# Patient Record
Sex: MALE | Race: WHITE | NOT HISPANIC OR LATINO | Employment: OTHER | URBAN - METROPOLITAN AREA
[De-identification: names, ages, dates, MRNs, and addresses within clinical notes are randomized per-mention and may not be internally consistent; named-entity substitution may affect disease eponyms.]

---

## 2022-10-17 ENCOUNTER — APPOINTMENT (OUTPATIENT)
Dept: RADIOLOGY | Facility: MEDICAL CENTER | Age: 72
DRG: 028 | End: 2022-10-17
Payer: OTHER MISCELLANEOUS

## 2022-10-17 ENCOUNTER — HOSPITAL ENCOUNTER (INPATIENT)
Facility: MEDICAL CENTER | Age: 72
LOS: 15 days | DRG: 028 | End: 2022-11-01
Attending: EMERGENCY MEDICINE | Admitting: SURGERY
Payer: OTHER MISCELLANEOUS

## 2022-10-17 ENCOUNTER — APPOINTMENT (OUTPATIENT)
Dept: RADIOLOGY | Facility: MEDICAL CENTER | Age: 72
DRG: 028 | End: 2022-10-17
Attending: NEUROLOGICAL SURGERY
Payer: OTHER MISCELLANEOUS

## 2022-10-17 ENCOUNTER — APPOINTMENT (OUTPATIENT)
Dept: RADIOLOGY | Facility: MEDICAL CENTER | Age: 72
DRG: 028 | End: 2022-10-17
Attending: EMERGENCY MEDICINE
Payer: OTHER MISCELLANEOUS

## 2022-10-17 DIAGNOSIS — I10 PRIMARY HYPERTENSION: ICD-10-CM

## 2022-10-17 DIAGNOSIS — T14.90XA TRAUMA: ICD-10-CM

## 2022-10-17 DIAGNOSIS — T14.8XXA: ICD-10-CM

## 2022-10-17 DIAGNOSIS — S12.600A CLOSED DISPLACED FRACTURE OF SEVENTH CERVICAL VERTEBRA, UNSPECIFIED FRACTURE MORPHOLOGY, INITIAL ENCOUNTER (HCC): ICD-10-CM

## 2022-10-17 PROBLEM — G47.09 OTHER INSOMNIA: Status: ACTIVE | Noted: 2022-10-17

## 2022-10-17 PROBLEM — S22.009A: Status: ACTIVE | Noted: 2022-10-17

## 2022-10-17 PROBLEM — G89.29 PAIN, CHRONIC: Status: ACTIVE | Noted: 2022-10-17

## 2022-10-17 PROBLEM — S61.219A FINGER LACERATION, INITIAL ENCOUNTER: Status: ACTIVE | Noted: 2022-10-17

## 2022-10-17 LAB
ABO GROUP BLD: NORMAL
ALBUMIN SERPL BCP-MCNC: 3.2 G/DL (ref 3.2–4.9)
ALBUMIN/GLOB SERPL: 1.1 G/DL
ALP SERPL-CCNC: 101 U/L (ref 30–99)
ALT SERPL-CCNC: 25 U/L (ref 2–50)
ANION GAP SERPL CALC-SCNC: 8 MMOL/L (ref 7–16)
APTT PPP: 32.5 SEC (ref 24.7–36)
AST SERPL-CCNC: 46 U/L (ref 12–45)
BILIRUB SERPL-MCNC: 0.8 MG/DL (ref 0.1–1.5)
BLD GP AB SCN SERPL QL: NORMAL
BUN SERPL-MCNC: 37 MG/DL (ref 8–22)
CALCIUM SERPL-MCNC: 7.7 MG/DL (ref 8.5–10.5)
CFT BLD TEG: 4.1 MIN (ref 4.6–9.1)
CFT P HPASE BLD TEG: 4.2 MIN (ref 4.3–8.3)
CHLORIDE SERPL-SCNC: 107 MMOL/L (ref 96–112)
CLOT ANGLE BLD TEG: 77.5 DEGREES (ref 63–78)
CLOT LYSIS 30M P MA LENFR BLD TEG: 0.1 % (ref 0–2.6)
CO2 SERPL-SCNC: 21 MMOL/L (ref 20–33)
CREAT SERPL-MCNC: 1.08 MG/DL (ref 0.5–1.4)
CT.EXTRINSIC BLD ROTEM: 0.9 MIN (ref 0.8–2.1)
ERYTHROCYTE [DISTWIDTH] IN BLOOD BY AUTOMATED COUNT: 53.9 FL (ref 35.9–50)
ETHANOL BLD-MCNC: <10.1 MG/DL
GFR SERPLBLD CREATININE-BSD FMLA CKD-EPI: 73 ML/MIN/1.73 M 2
GLOBULIN SER CALC-MCNC: 2.9 G/DL (ref 1.9–3.5)
GLUCOSE SERPL-MCNC: 128 MG/DL (ref 65–99)
HCT VFR BLD AUTO: 20.2 % (ref 42–52)
HGB BLD-MCNC: 6.5 G/DL (ref 14–18)
INR PPP: 1.5 (ref 0.87–1.13)
MCF BLD TEG: 64.9 MM (ref 52–69)
MCF.PLATELET INHIB BLD ROTEM: 21.5 MM (ref 15–32)
MCH RBC QN AUTO: 30.2 PG (ref 27–33)
MCHC RBC AUTO-ENTMCNC: 32.2 G/DL (ref 33.7–35.3)
MCV RBC AUTO: 94 FL (ref 81.4–97.8)
PA AA BLD-ACNC: 50.8 % (ref 0–11)
PA ADP BLD-ACNC: 90 % (ref 0–17)
PLATELET # BLD AUTO: 120 K/UL (ref 164–446)
PMV BLD AUTO: 12.8 FL (ref 9–12.9)
POTASSIUM SERPL-SCNC: 5.3 MMOL/L (ref 3.6–5.5)
PROT SERPL-MCNC: 6.1 G/DL (ref 6–8.2)
PROTHROMBIN TIME: 17.8 SEC (ref 12–14.6)
RBC # BLD AUTO: 2.15 M/UL (ref 4.7–6.1)
RH BLD: NORMAL
SODIUM SERPL-SCNC: 136 MMOL/L (ref 135–145)
TEG ALGORITHM TGALG: ABNORMAL
WBC # BLD AUTO: 17 K/UL (ref 4.8–10.8)

## 2022-10-17 PROCEDURE — 85610 PROTHROMBIN TIME: CPT

## 2022-10-17 PROCEDURE — 72170 X-RAY EXAM OF PELVIS: CPT

## 2022-10-17 PROCEDURE — 86901 BLOOD TYPING SEROLOGIC RH(D): CPT

## 2022-10-17 PROCEDURE — 80053 COMPREHEN METABOLIC PANEL: CPT

## 2022-10-17 PROCEDURE — 73070 X-RAY EXAM OF ELBOW: CPT | Mod: LT

## 2022-10-17 PROCEDURE — 770022 HCHG ROOM/CARE - ICU (200)

## 2022-10-17 PROCEDURE — 36415 COLL VENOUS BLD VENIPUNCTURE: CPT

## 2022-10-17 PROCEDURE — 12002 RPR S/N/AX/GEN/TRNK2.6-7.5CM: CPT | Performed by: SPECIALIST

## 2022-10-17 PROCEDURE — 85027 COMPLETE CBC AUTOMATED: CPT

## 2022-10-17 PROCEDURE — 90715 TDAP VACCINE 7 YRS/> IM: CPT | Performed by: EMERGENCY MEDICINE

## 2022-10-17 PROCEDURE — 72131 CT LUMBAR SPINE W/O DYE: CPT

## 2022-10-17 PROCEDURE — 30233N1 TRANSFUSION OF NONAUTOLOGOUS RED BLOOD CELLS INTO PERIPHERAL VEIN, PERCUTANEOUS APPROACH: ICD-10-PCS | Performed by: SURGERY

## 2022-10-17 PROCEDURE — 70498 CT ANGIOGRAPHY NECK: CPT

## 2022-10-17 PROCEDURE — 90471 IMMUNIZATION ADMIN: CPT

## 2022-10-17 PROCEDURE — G0390 TRAUMA RESPONS W/HOSP CRITI: HCPCS

## 2022-10-17 PROCEDURE — 700111 HCHG RX REV CODE 636 W/ 250 OVERRIDE (IP): Performed by: EMERGENCY MEDICINE

## 2022-10-17 PROCEDURE — 96374 THER/PROPH/DIAG INJ IV PUSH: CPT

## 2022-10-17 PROCEDURE — 85730 THROMBOPLASTIN TIME PARTIAL: CPT

## 2022-10-17 PROCEDURE — 99291 CRITICAL CARE FIRST HOUR: CPT

## 2022-10-17 PROCEDURE — 700117 HCHG RX CONTRAST REV CODE 255: Performed by: EMERGENCY MEDICINE

## 2022-10-17 PROCEDURE — 86850 RBC ANTIBODY SCREEN: CPT

## 2022-10-17 PROCEDURE — P9016 RBC LEUKOCYTES REDUCED: HCPCS

## 2022-10-17 PROCEDURE — 700105 HCHG RX REV CODE 258: Performed by: SURGERY

## 2022-10-17 PROCEDURE — 85576 BLOOD PLATELET AGGREGATION: CPT | Mod: 91

## 2022-10-17 PROCEDURE — 71045 X-RAY EXAM CHEST 1 VIEW: CPT

## 2022-10-17 PROCEDURE — 700111 HCHG RX REV CODE 636 W/ 250 OVERRIDE (IP): Performed by: SURGERY

## 2022-10-17 PROCEDURE — 70450 CT HEAD/BRAIN W/O DYE: CPT

## 2022-10-17 PROCEDURE — 71260 CT THORAX DX C+: CPT

## 2022-10-17 PROCEDURE — 72141 MRI NECK SPINE W/O DYE: CPT

## 2022-10-17 PROCEDURE — 85347 COAGULATION TIME ACTIVATED: CPT

## 2022-10-17 PROCEDURE — 82077 ASSAY SPEC XCP UR&BREATH IA: CPT

## 2022-10-17 PROCEDURE — 700117 HCHG RX CONTRAST REV CODE 255: Performed by: NEUROLOGICAL SURGERY

## 2022-10-17 PROCEDURE — 85384 FIBRINOGEN ACTIVITY: CPT

## 2022-10-17 PROCEDURE — 99291 CRITICAL CARE FIRST HOUR: CPT | Performed by: SURGERY

## 2022-10-17 PROCEDURE — 0KQD0ZZ REPAIR LEFT HAND MUSCLE, OPEN APPROACH: ICD-10-PCS | Performed by: SURGERY

## 2022-10-17 PROCEDURE — 96375 TX/PRO/DX INJ NEW DRUG ADDON: CPT

## 2022-10-17 PROCEDURE — 700101 HCHG RX REV CODE 250

## 2022-10-17 PROCEDURE — A9270 NON-COVERED ITEM OR SERVICE: HCPCS | Performed by: SURGERY

## 2022-10-17 PROCEDURE — 72128 CT CHEST SPINE W/O DYE: CPT

## 2022-10-17 PROCEDURE — 86900 BLOOD TYPING SEROLOGIC ABO: CPT

## 2022-10-17 PROCEDURE — 72125 CT NECK SPINE W/O DYE: CPT

## 2022-10-17 PROCEDURE — 36430 TRANSFUSION BLD/BLD COMPNT: CPT

## 2022-10-17 PROCEDURE — 700102 HCHG RX REV CODE 250 W/ 637 OVERRIDE(OP): Performed by: SURGERY

## 2022-10-17 PROCEDURE — 3E0234Z INTRODUCTION OF SERUM, TOXOID AND VACCINE INTO MUSCLE, PERCUTANEOUS APPROACH: ICD-10-PCS | Performed by: EMERGENCY MEDICINE

## 2022-10-17 PROCEDURE — 86923 COMPATIBILITY TEST ELECTRIC: CPT

## 2022-10-17 RX ORDER — ACETAMINOPHEN 325 MG/1
650 TABLET ORAL EVERY 6 HOURS PRN
Status: DISCONTINUED | OUTPATIENT
Start: 2022-10-22 | End: 2022-10-18

## 2022-10-17 RX ORDER — BISACODYL 10 MG
10 SUPPOSITORY, RECTAL RECTAL
Status: DISCONTINUED | OUTPATIENT
Start: 2022-10-17 | End: 2022-11-01 | Stop reason: HOSPADM

## 2022-10-17 RX ORDER — OXYCODONE HYDROCHLORIDE 10 MG/1
10 TABLET ORAL ONCE
Status: COMPLETED | OUTPATIENT
Start: 2022-10-17 | End: 2022-10-17

## 2022-10-17 RX ORDER — METAXALONE 800 MG/1
400 TABLET ORAL 3 TIMES DAILY
Status: DISCONTINUED | OUTPATIENT
Start: 2022-10-17 | End: 2022-10-18

## 2022-10-17 RX ORDER — BACITRACIN ZINC 500 [USP'U]/G
OINTMENT TOPICAL DAILY
Status: DISCONTINUED | OUTPATIENT
Start: 2022-10-18 | End: 2022-10-23

## 2022-10-17 RX ORDER — ENEMA 19; 7 G/133ML; G/133ML
1 ENEMA RECTAL
Status: DISCONTINUED | OUTPATIENT
Start: 2022-10-17 | End: 2022-11-01 | Stop reason: HOSPADM

## 2022-10-17 RX ORDER — AMOXICILLIN 250 MG
1 CAPSULE ORAL NIGHTLY
Status: DISCONTINUED | OUTPATIENT
Start: 2022-10-17 | End: 2022-10-18

## 2022-10-17 RX ORDER — TRAZODONE HYDROCHLORIDE 50 MG/1
25-50 TABLET ORAL
COMMUNITY

## 2022-10-17 RX ORDER — OXYCODONE HYDROCHLORIDE 5 MG/1
5 TABLET ORAL EVERY 4 HOURS PRN
COMMUNITY

## 2022-10-17 RX ORDER — HYDROMORPHONE HYDROCHLORIDE 1 MG/ML
0.5 INJECTION, SOLUTION INTRAMUSCULAR; INTRAVENOUS; SUBCUTANEOUS
Status: DISCONTINUED | OUTPATIENT
Start: 2022-10-17 | End: 2022-10-18

## 2022-10-17 RX ORDER — AMOXICILLIN 250 MG
1 CAPSULE ORAL
Status: DISCONTINUED | OUTPATIENT
Start: 2022-10-17 | End: 2022-10-18

## 2022-10-17 RX ORDER — FERROUS GLUCONATE 324(38)MG
324 TABLET ORAL
Status: SHIPPED | COMMUNITY
End: 2022-10-17

## 2022-10-17 RX ORDER — SODIUM CHLORIDE 9 MG/ML
INJECTION, SOLUTION INTRAVENOUS CONTINUOUS
Status: DISCONTINUED | OUTPATIENT
Start: 2022-10-17 | End: 2022-10-20

## 2022-10-17 RX ORDER — CEFAZOLIN 2 G/1
2 INJECTION, POWDER, FOR SOLUTION INTRAMUSCULAR; INTRAVENOUS ONCE
Status: COMPLETED | OUTPATIENT
Start: 2022-10-17 | End: 2022-10-17

## 2022-10-17 RX ORDER — OXYCODONE HCL 10 MG/1
10 TABLET, FILM COATED, EXTENDED RELEASE ORAL EVERY 12 HOURS
COMMUNITY

## 2022-10-17 RX ORDER — ACETAMINOPHEN 325 MG/1
650 TABLET ORAL EVERY 6 HOURS
Status: DISCONTINUED | OUTPATIENT
Start: 2022-10-17 | End: 2022-10-18

## 2022-10-17 RX ORDER — LORAZEPAM 1 MG/1
1 TABLET ORAL 2 TIMES DAILY PRN
COMMUNITY

## 2022-10-17 RX ORDER — PANTOPRAZOLE SODIUM 40 MG/10ML
40 INJECTION, POWDER, LYOPHILIZED, FOR SOLUTION INTRAVENOUS DAILY
Status: DISCONTINUED | OUTPATIENT
Start: 2022-10-18 | End: 2022-10-20

## 2022-10-17 RX ORDER — OXYCODONE HYDROCHLORIDE 5 MG/1
5 TABLET ORAL
Status: DISCONTINUED | OUTPATIENT
Start: 2022-10-17 | End: 2022-10-18

## 2022-10-17 RX ORDER — DOCUSATE SODIUM 100 MG/1
100 CAPSULE, LIQUID FILLED ORAL 2 TIMES DAILY
Status: DISCONTINUED | OUTPATIENT
Start: 2022-10-17 | End: 2022-10-18

## 2022-10-17 RX ORDER — POLYETHYLENE GLYCOL 3350 17 G/17G
1 POWDER, FOR SOLUTION ORAL 2 TIMES DAILY
Status: DISCONTINUED | OUTPATIENT
Start: 2022-10-17 | End: 2022-10-18

## 2022-10-17 RX ORDER — ONDANSETRON 2 MG/ML
4 INJECTION INTRAMUSCULAR; INTRAVENOUS EVERY 4 HOURS PRN
Status: DISCONTINUED | OUTPATIENT
Start: 2022-10-17 | End: 2022-11-01 | Stop reason: HOSPADM

## 2022-10-17 RX ORDER — MAGNESIUM OXIDE 400 MG/1
400 TABLET ORAL DAILY
Status: SHIPPED | COMMUNITY
End: 2022-10-17

## 2022-10-17 RX ORDER — LISINOPRIL 5 MG/1
5 TABLET ORAL DAILY
Status: SHIPPED | COMMUNITY
End: 2022-10-17

## 2022-10-17 RX ORDER — ONDANSETRON 8 MG/1
8 TABLET, ORALLY DISINTEGRATING ORAL EVERY 8 HOURS PRN
Status: SHIPPED | COMMUNITY
End: 2022-10-17

## 2022-10-17 RX ORDER — OXYCODONE HYDROCHLORIDE 10 MG/1
10 TABLET ORAL
Status: DISCONTINUED | OUTPATIENT
Start: 2022-10-17 | End: 2022-10-18

## 2022-10-17 RX ORDER — LIDOCAINE HYDROCHLORIDE 10 MG/ML
INJECTION, SOLUTION INFILTRATION; PERINEURAL
Status: COMPLETED
Start: 2022-10-17 | End: 2022-10-17

## 2022-10-17 RX ORDER — GABAPENTIN 100 MG/1
100 CAPSULE ORAL EVERY 8 HOURS
Status: DISCONTINUED | OUTPATIENT
Start: 2022-10-17 | End: 2022-10-18

## 2022-10-17 RX ADMIN — LIDOCAINE HYDROCHLORIDE: 10 INJECTION, SOLUTION INFILTRATION; PERINEURAL at 21:52

## 2022-10-17 RX ADMIN — HYDROMORPHONE HYDROCHLORIDE 0.5 MG: 1 INJECTION, SOLUTION INTRAMUSCULAR; INTRAVENOUS; SUBCUTANEOUS at 19:23

## 2022-10-17 RX ADMIN — METAXALONE 400 MG: 800 TABLET ORAL at 20:36

## 2022-10-17 RX ADMIN — SODIUM CHLORIDE: 9 INJECTION, SOLUTION INTRAVENOUS at 20:37

## 2022-10-17 RX ADMIN — OXYCODONE HYDROCHLORIDE 10 MG: 10 TABLET ORAL at 21:34

## 2022-10-17 RX ADMIN — CLOSTRIDIUM TETANI TOXOID ANTIGEN (FORMALDEHYDE INACTIVATED), CORYNEBACTERIUM DIPHTHERIAE TOXOID ANTIGEN (FORMALDEHYDE INACTIVATED), BORDETELLA PERTUSSIS TOXOID ANTIGEN (GLUTARALDEHYDE INACTIVATED), BORDETELLA PERTUSSIS FILAMENTOUS HEMAGGLUTININ ANTIGEN (FORMALDEHYDE INACTIVATED), BORDETELLA PERTUSSIS PERTACTIN ANTIGEN, AND BORDETELLA PERTUSSIS FIMBRIAE 2/3 ANTIGEN 0.5 ML: 5; 2; 2.5; 5; 3; 5 INJECTION, SUSPENSION INTRAMUSCULAR at 16:04

## 2022-10-17 RX ADMIN — FENTANYL CITRATE 50 MCG: 50 INJECTION, SOLUTION INTRAMUSCULAR; INTRAVENOUS at 16:11

## 2022-10-17 RX ADMIN — IOHEXOL 80 ML: 350 INJECTION, SOLUTION INTRAVENOUS at 19:45

## 2022-10-17 RX ADMIN — IOHEXOL 100 ML: 350 INJECTION, SOLUTION INTRAVENOUS at 16:05

## 2022-10-17 RX ADMIN — CEFAZOLIN 2 G: 2 INJECTION, POWDER, FOR SOLUTION INTRAMUSCULAR; INTRAVENOUS at 16:04

## 2022-10-17 RX ADMIN — GABAPENTIN 100 MG: 100 CAPSULE ORAL at 21:35

## 2022-10-17 ASSESSMENT — FIBROSIS 4 INDEX: FIB4 SCORE: 5.44

## 2022-10-17 ASSESSMENT — LIFESTYLE VARIABLES
ON A TYPICAL DAY WHEN YOU DRINK ALCOHOL HOW MANY DRINKS DO YOU HAVE: 0
HOW MANY TIMES IN THE PAST YEAR HAVE YOU HAD 5 OR MORE DRINKS IN A DAY: 0
TOTAL SCORE: 0
EVER FELT BAD OR GUILTY ABOUT YOUR DRINKING: NO
DO YOU DRINK ALCOHOL: NO
TOTAL SCORE: 0
TOTAL SCORE: 0
EVER HAD A DRINK FIRST THING IN THE MORNING TO STEADY YOUR NERVES TO GET RID OF A HANGOVER: NO
CONSUMPTION TOTAL: NEGATIVE
AVERAGE NUMBER OF DAYS PER WEEK YOU HAVE A DRINK CONTAINING ALCOHOL: 0
HAVE YOU EVER FELT YOU SHOULD CUT DOWN ON YOUR DRINKING: NO
HAVE PEOPLE ANNOYED YOU BY CRITICIZING YOUR DRINKING: NO

## 2022-10-17 ASSESSMENT — COPD QUESTIONNAIRES
DO YOU EVER COUGH UP ANY MUCUS OR PHLEGM?: NO/ONLY WITH OCCASIONAL COLDS OR INFECTIONS
COPD SCREENING SCORE: 4
HAVE YOU SMOKED AT LEAST 100 CIGARETTES IN YOUR ENTIRE LIFE: NO/DON'T KNOW
DURING THE PAST 4 WEEKS HOW MUCH DID YOU FEEL SHORT OF BREATH: SOME OF THE TIME

## 2022-10-17 ASSESSMENT — PAIN DESCRIPTION - PAIN TYPE
TYPE: ACUTE PAIN;CHRONIC PAIN
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
## 2022-10-18 ENCOUNTER — APPOINTMENT (OUTPATIENT)
Dept: RADIOLOGY | Facility: MEDICAL CENTER | Age: 72
DRG: 028 | End: 2022-10-18
Attending: NURSE PRACTITIONER
Payer: OTHER MISCELLANEOUS

## 2022-10-18 ENCOUNTER — APPOINTMENT (OUTPATIENT)
Dept: RADIOLOGY | Facility: MEDICAL CENTER | Age: 72
DRG: 028 | End: 2022-10-18
Attending: SPECIALIST
Payer: OTHER MISCELLANEOUS

## 2022-10-18 PROBLEM — J90 BILATERAL PLEURAL EFFUSION: Status: ACTIVE | Noted: 2022-10-18

## 2022-10-18 PROBLEM — M25.521 RIGHT ELBOW PAIN: Status: ACTIVE | Noted: 2022-10-18

## 2022-10-18 PROBLEM — M25.572 ACUTE LEFT ANKLE PAIN: Status: ACTIVE | Noted: 2022-10-18

## 2022-10-18 PROBLEM — I65.22 INTERNAL CAROTID ARTERY STENOSIS, LEFT: Status: ACTIVE | Noted: 2022-10-18

## 2022-10-18 LAB
ALBUMIN SERPL BCP-MCNC: 2.9 G/DL (ref 3.2–4.9)
ALBUMIN/GLOB SERPL: 1.1 G/DL
ALP SERPL-CCNC: 88 U/L (ref 30–99)
ALT SERPL-CCNC: 26 U/L (ref 2–50)
ANION GAP SERPL CALC-SCNC: 8 MMOL/L (ref 7–16)
AST SERPL-CCNC: 64 U/L (ref 12–45)
BASOPHILS # BLD AUTO: 0 % (ref 0–1.8)
BASOPHILS # BLD AUTO: 0.1 % (ref 0–1.8)
BASOPHILS # BLD: 0 K/UL (ref 0–0.12)
BASOPHILS # BLD: 0.01 K/UL (ref 0–0.12)
BILIRUB SERPL-MCNC: 1.5 MG/DL (ref 0.1–1.5)
BUN SERPL-MCNC: 35 MG/DL (ref 8–22)
CALCIUM SERPL-MCNC: 7.6 MG/DL (ref 8.5–10.5)
CFT BLD TEG: 6.1 MIN (ref 4.6–9.1)
CFT P HPASE BLD TEG: 7 MIN (ref 4.3–8.3)
CHLORIDE SERPL-SCNC: 107 MMOL/L (ref 96–112)
CLOT ANGLE BLD TEG: 70.1 DEGREES (ref 63–78)
CLOT LYSIS 30M P MA LENFR BLD TEG: 1 % (ref 0–2.6)
CO2 SERPL-SCNC: 20 MMOL/L (ref 20–33)
CREAT SERPL-MCNC: 1.08 MG/DL (ref 0.5–1.4)
CT.EXTRINSIC BLD ROTEM: 1.5 MIN (ref 0.8–2.1)
EOSINOPHIL # BLD AUTO: 0 K/UL (ref 0–0.51)
EOSINOPHIL # BLD AUTO: 0.02 K/UL (ref 0–0.51)
EOSINOPHIL NFR BLD: 0 % (ref 0–6.9)
EOSINOPHIL NFR BLD: 0.3 % (ref 0–6.9)
ERYTHROCYTE [DISTWIDTH] IN BLOOD BY AUTOMATED COUNT: 50.8 FL (ref 35.9–50)
ERYTHROCYTE [DISTWIDTH] IN BLOOD BY AUTOMATED COUNT: 54.4 FL (ref 35.9–50)
GFR SERPLBLD CREATININE-BSD FMLA CKD-EPI: 73 ML/MIN/1.73 M 2
GLOBULIN SER CALC-MCNC: 2.7 G/DL (ref 1.9–3.5)
GLUCOSE BLD STRIP.AUTO-MCNC: 103 MG/DL (ref 65–99)
GLUCOSE BLD STRIP.AUTO-MCNC: 114 MG/DL (ref 65–99)
GLUCOSE SERPL-MCNC: 139 MG/DL (ref 65–99)
HCT VFR BLD AUTO: 20.4 % (ref 42–52)
HCT VFR BLD AUTO: 22.1 % (ref 42–52)
HGB BLD-MCNC: 6.6 G/DL (ref 14–18)
HGB BLD-MCNC: 7.1 G/DL (ref 14–18)
HGB RETIC QN AUTO: 27.7 PG/CELL (ref 29–35)
IMM GRANULOCYTES # BLD AUTO: 0.06 K/UL (ref 0–0.11)
IMM GRANULOCYTES # BLD AUTO: 0.07 K/UL (ref 0–0.11)
IMM GRANULOCYTES NFR BLD AUTO: 0.8 % (ref 0–0.9)
IMM GRANULOCYTES NFR BLD AUTO: 0.8 % (ref 0–0.9)
IMM RETICS NFR: 36.6 % (ref 9.3–17.4)
IRON SATN MFR SERPL: 10 % (ref 15–55)
IRON SERPL-MCNC: 21 UG/DL (ref 50–180)
LYMPHOCYTES # BLD AUTO: 0.35 K/UL (ref 1–4.8)
LYMPHOCYTES # BLD AUTO: 0.37 K/UL (ref 1–4.8)
LYMPHOCYTES NFR BLD: 3.8 % (ref 22–41)
LYMPHOCYTES NFR BLD: 5 % (ref 22–41)
MCF BLD TEG: 58.9 MM (ref 52–69)
MCF.PLATELET INHIB BLD ROTEM: 19.4 MM (ref 15–32)
MCH RBC QN AUTO: 29.2 PG (ref 27–33)
MCH RBC QN AUTO: 29.3 PG (ref 27–33)
MCHC RBC AUTO-ENTMCNC: 32.1 G/DL (ref 33.7–35.3)
MCHC RBC AUTO-ENTMCNC: 32.4 G/DL (ref 33.7–35.3)
MCV RBC AUTO: 90.7 FL (ref 81.4–97.8)
MCV RBC AUTO: 90.9 FL (ref 81.4–97.8)
MONOCYTES # BLD AUTO: 0.47 K/UL (ref 0–0.85)
MONOCYTES # BLD AUTO: 0.49 K/UL (ref 0–0.85)
MONOCYTES NFR BLD AUTO: 5.3 % (ref 0–13.4)
MONOCYTES NFR BLD AUTO: 6.4 % (ref 0–13.4)
NEUTROPHILS # BLD AUTO: 6.43 K/UL (ref 1.82–7.42)
NEUTROPHILS # BLD AUTO: 8.31 K/UL (ref 1.82–7.42)
NEUTROPHILS NFR BLD: 87.4 % (ref 44–72)
NEUTROPHILS NFR BLD: 90.1 % (ref 44–72)
NRBC # BLD AUTO: 0 K/UL
NRBC # BLD AUTO: 0 K/UL
NRBC BLD-RTO: 0 /100 WBC
NRBC BLD-RTO: 0 /100 WBC
PA AA BLD-ACNC: 28 % (ref 0–11)
PA ADP BLD-ACNC: 43.3 % (ref 0–17)
PLATELET # BLD AUTO: 63 K/UL (ref 164–446)
PLATELET # BLD AUTO: 75 K/UL (ref 164–446)
PMV BLD AUTO: 11.9 FL (ref 9–12.9)
PMV BLD AUTO: 12.5 FL (ref 9–12.9)
POTASSIUM SERPL-SCNC: 5.7 MMOL/L (ref 3.6–5.5)
PROT SERPL-MCNC: 5.6 G/DL (ref 6–8.2)
RBC # BLD AUTO: 2.25 M/UL (ref 4.7–6.1)
RBC # BLD AUTO: 2.43 M/UL (ref 4.7–6.1)
RETICS # AUTO: 0.09 M/UL (ref 0.04–0.06)
RETICS/RBC NFR: 4.2 % (ref 0.8–2.1)
SODIUM SERPL-SCNC: 135 MMOL/L (ref 135–145)
TEG ALGORITHM TGALG: ABNORMAL
TIBC SERPL-MCNC: 210 UG/DL (ref 250–450)
UIBC SERPL-MCNC: 189 UG/DL (ref 110–370)
WBC # BLD AUTO: 7.4 K/UL (ref 4.8–10.8)
WBC # BLD AUTO: 9.2 K/UL (ref 4.8–10.8)

## 2022-10-18 PROCEDURE — 73110 X-RAY EXAM OF WRIST: CPT | Mod: LT

## 2022-10-18 PROCEDURE — 85347 COAGULATION TIME ACTIVATED: CPT

## 2022-10-18 PROCEDURE — 36430 TRANSFUSION BLD/BLD COMPNT: CPT

## 2022-10-18 PROCEDURE — 86923 COMPATIBILITY TEST ELECTRIC: CPT

## 2022-10-18 PROCEDURE — 85046 RETICYTE/HGB CONCENTRATE: CPT

## 2022-10-18 PROCEDURE — 85384 FIBRINOGEN ACTIVITY: CPT

## 2022-10-18 PROCEDURE — A9270 NON-COVERED ITEM OR SERVICE: HCPCS | Performed by: SURGERY

## 2022-10-18 PROCEDURE — 82962 GLUCOSE BLOOD TEST: CPT | Mod: 91

## 2022-10-18 PROCEDURE — 80053 COMPREHEN METABOLIC PANEL: CPT

## 2022-10-18 PROCEDURE — 73130 X-RAY EXAM OF HAND: CPT | Mod: LT

## 2022-10-18 PROCEDURE — P9016 RBC LEUKOCYTES REDUCED: HCPCS

## 2022-10-18 PROCEDURE — 770022 HCHG ROOM/CARE - ICU (200)

## 2022-10-18 PROCEDURE — 700111 HCHG RX REV CODE 636 W/ 250 OVERRIDE (IP): Performed by: SURGERY

## 2022-10-18 PROCEDURE — 700105 HCHG RX REV CODE 258: Performed by: SURGERY

## 2022-10-18 PROCEDURE — 85576 BLOOD PLATELET AGGREGATION: CPT | Mod: 91

## 2022-10-18 PROCEDURE — 83550 IRON BINDING TEST: CPT

## 2022-10-18 PROCEDURE — 73080 X-RAY EXAM OF ELBOW: CPT | Mod: LT

## 2022-10-18 PROCEDURE — C9113 INJ PANTOPRAZOLE SODIUM, VIA: HCPCS | Performed by: SURGERY

## 2022-10-18 PROCEDURE — 99233 SBSQ HOSP IP/OBS HIGH 50: CPT | Performed by: SURGERY

## 2022-10-18 PROCEDURE — 700102 HCHG RX REV CODE 250 W/ 637 OVERRIDE(OP): Performed by: SURGERY

## 2022-10-18 PROCEDURE — 83540 ASSAY OF IRON: CPT

## 2022-10-18 PROCEDURE — 73610 X-RAY EXAM OF ANKLE: CPT | Mod: LT

## 2022-10-18 PROCEDURE — 85025 COMPLETE CBC W/AUTO DIFF WBC: CPT | Mod: 91

## 2022-10-18 RX ORDER — ACETAMINOPHEN 325 MG/1
650 TABLET ORAL EVERY 6 HOURS
Status: DISCONTINUED | OUTPATIENT
Start: 2022-10-18 | End: 2022-10-19

## 2022-10-18 RX ORDER — ACETAMINOPHEN 325 MG/1
650 TABLET ORAL EVERY 6 HOURS PRN
Status: DISCONTINUED | OUTPATIENT
Start: 2022-10-22 | End: 2022-10-19

## 2022-10-18 RX ORDER — HYDROMORPHONE HYDROCHLORIDE 1 MG/ML
0.5 INJECTION, SOLUTION INTRAMUSCULAR; INTRAVENOUS; SUBCUTANEOUS
Status: DISCONTINUED | OUTPATIENT
Start: 2022-10-18 | End: 2022-10-19

## 2022-10-18 RX ORDER — AMOXICILLIN 250 MG
1 CAPSULE ORAL
Status: DISCONTINUED | OUTPATIENT
Start: 2022-10-18 | End: 2022-11-01 | Stop reason: HOSPADM

## 2022-10-18 RX ORDER — DOCUSATE SODIUM 50 MG/5ML
100 LIQUID ORAL 2 TIMES DAILY
Status: DISCONTINUED | OUTPATIENT
Start: 2022-10-18 | End: 2022-11-01 | Stop reason: HOSPADM

## 2022-10-18 RX ORDER — POLYETHYLENE GLYCOL 3350 17 G/17G
1 POWDER, FOR SOLUTION ORAL 2 TIMES DAILY
Status: DISCONTINUED | OUTPATIENT
Start: 2022-10-18 | End: 2022-11-01 | Stop reason: HOSPADM

## 2022-10-18 RX ORDER — GABAPENTIN 100 MG/1
100 CAPSULE ORAL EVERY 8 HOURS
Status: DISCONTINUED | OUTPATIENT
Start: 2022-10-18 | End: 2022-10-26

## 2022-10-18 RX ORDER — OXYCODONE HYDROCHLORIDE 10 MG/1
10 TABLET ORAL
Status: DISCONTINUED | OUTPATIENT
Start: 2022-10-18 | End: 2022-10-19

## 2022-10-18 RX ORDER — OXYCODONE HYDROCHLORIDE 5 MG/1
5 TABLET ORAL
Status: DISCONTINUED | OUTPATIENT
Start: 2022-10-18 | End: 2022-10-19

## 2022-10-18 RX ORDER — METAXALONE 800 MG/1
400 TABLET ORAL 3 TIMES DAILY
Status: DISCONTINUED | OUTPATIENT
Start: 2022-10-18 | End: 2022-11-01 | Stop reason: HOSPADM

## 2022-10-18 RX ORDER — AMOXICILLIN 250 MG
1 CAPSULE ORAL NIGHTLY
Status: DISCONTINUED | OUTPATIENT
Start: 2022-10-18 | End: 2022-11-01 | Stop reason: HOSPADM

## 2022-10-18 RX ADMIN — SODIUM CHLORIDE 250 MG: 9 INJECTION, SOLUTION INTRAVENOUS at 17:48

## 2022-10-18 RX ADMIN — PANTOPRAZOLE SODIUM 40 MG: 40 INJECTION, POWDER, LYOPHILIZED, FOR SOLUTION INTRAVENOUS at 05:24

## 2022-10-18 RX ADMIN — HYDROMORPHONE HYDROCHLORIDE 0.5 MG: 1 INJECTION, SOLUTION INTRAMUSCULAR; INTRAVENOUS; SUBCUTANEOUS at 17:22

## 2022-10-18 RX ADMIN — SODIUM CHLORIDE: 9 INJECTION, SOLUTION INTRAVENOUS at 20:38

## 2022-10-18 RX ADMIN — BACITRACIN ZINC: 500 OINTMENT TOPICAL at 09:51

## 2022-10-18 RX ADMIN — OXYCODONE HYDROCHLORIDE 10 MG: 10 TABLET ORAL at 00:43

## 2022-10-18 RX ADMIN — OXYCODONE HYDROCHLORIDE 10 MG: 10 TABLET ORAL at 04:30

## 2022-10-18 RX ADMIN — SODIUM CHLORIDE: 9 INJECTION, SOLUTION INTRAVENOUS at 09:15

## 2022-10-18 RX ADMIN — DOCUSATE SODIUM 100 MG: 100 CAPSULE, LIQUID FILLED ORAL at 05:24

## 2022-10-18 RX ADMIN — METAXALONE 400 MG: 800 TABLET ORAL at 12:16

## 2022-10-18 RX ADMIN — METAXALONE 400 MG: 800 TABLET ORAL at 17:22

## 2022-10-18 RX ADMIN — GABAPENTIN 100 MG: 100 CAPSULE ORAL at 22:27

## 2022-10-18 RX ADMIN — GABAPENTIN 100 MG: 100 CAPSULE ORAL at 14:20

## 2022-10-18 RX ADMIN — OXYCODONE HYDROCHLORIDE 10 MG: 10 TABLET ORAL at 09:25

## 2022-10-18 RX ADMIN — DOCUSATE SODIUM 100 MG: 50 LIQUID ORAL at 17:21

## 2022-10-18 RX ADMIN — OXYCODONE HYDROCHLORIDE 10 MG: 10 TABLET ORAL at 20:37

## 2022-10-18 RX ADMIN — SENNOSIDES AND DOCUSATE SODIUM 1 TABLET: 50; 8.6 TABLET ORAL at 20:37

## 2022-10-18 RX ADMIN — GABAPENTIN 100 MG: 100 CAPSULE ORAL at 05:24

## 2022-10-18 RX ADMIN — HYDROMORPHONE HYDROCHLORIDE 0.5 MG: 1 INJECTION, SOLUTION INTRAMUSCULAR; INTRAVENOUS; SUBCUTANEOUS at 01:47

## 2022-10-18 RX ADMIN — OXYCODONE HYDROCHLORIDE 10 MG: 10 TABLET ORAL at 15:45

## 2022-10-18 RX ADMIN — METAXALONE 400 MG: 800 TABLET ORAL at 05:25

## 2022-10-18 ASSESSMENT — PAIN DESCRIPTION - PAIN TYPE
TYPE: ACUTE PAIN;CHRONIC PAIN
TYPE: ACUTE PAIN
TYPE: ACUTE PAIN;SURGICAL PAIN
TYPE: ACUTE PAIN
TYPE: ACUTE PAIN
TYPE: ACUTE PAIN;CHRONIC PAIN
TYPE: ACUTE PAIN;CHRONIC PAIN
TYPE: ACUTE PAIN
TYPE: ACUTE PAIN
TYPE: ACUTE PAIN;CHRONIC PAIN
TYPE: ACUTE PAIN
TYPE: ACUTE PAIN
TYPE: ACUTE PAIN;CHRONIC PAIN
TYPE: ACUTE PAIN
TYPE: ACUTE PAIN;CHRONIC PAIN
TYPE: ACUTE PAIN

## 2022-10-18 ASSESSMENT — ENCOUNTER SYMPTOMS
BACK PAIN: 1
CONSTITUTIONAL NEGATIVE: 1
MYALGIAS: 1
EYES NEGATIVE: 1
CARDIOVASCULAR NEGATIVE: 1
GASTROINTESTINAL NEGATIVE: 1
NECK PAIN: 1
NEUROLOGICAL NEGATIVE: 1
RESPIRATORY NEGATIVE: 1

## 2022-10-18 ASSESSMENT — FIBROSIS 4 INDEX
FIB4 SCORE: 5.44
FIB4 SCORE: 11.88
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
## 2022-10-18 NOTE — ED NOTES
ICU RN @ bedside. Report given. NAD. VSS . Respirations equal and unlabored. All belongings with pt. A/O4
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
## 2022-10-18 NOTE — ASSESSMENT & PLAN NOTE
Admission hemoglobin 6.5  Hx of chronic anemia  Transfuse 1 unit on admission  10/18 Transfused 1 unit of PRBC's. Iron studies low and replaced per pharmacy kinetics.   10/19 Transfused 5U PRBC, 1U FFP, 1U Platelets intra-op secondary to blood loss  Transfuse 1 unit PRBC if Hb < 7.0

## 2022-10-18 NOTE — ASSESSMENT & PLAN NOTE
3 cm finger laceration repaired with Vicryl.  Diagnostic imaging of wrist with no acute fracture.  Diagnostic imaging of hand with a possible non-acute appearing 2nd digit distal phalanx deformity possibly avulsion injury as the overlying soft tissue appears smooth.    Plan for suture removal in 10-14 days (10/27-10/31).

## 2022-10-18 NOTE — ASSESSMENT & PLAN NOTE
Left arm partial thickness abrasion.  Bacitracin and non-adherent dressing daily.  Wound care following.

## 2022-10-18 NOTE — ASSESSMENT & PLAN NOTE
10/17 No definite fracture seen but evaluation is limited due to positioning.  10/18 Follow up imaging with no definite fractures.

## 2022-10-18 NOTE — THERAPY
Missed Therapy     Patient Name: Terence Mckeon Jr.  Age:  72 y.o., Sex:  male  Medical Record #: [id number]  Today's Date: 10/18/2022         10/18/22 0959   Initial Contact Note    Initial Contact Note Order Received and Verified, Occupational Therapy Evaluation in Progress with Full Report to Follow.   Interdisciplinary Plan of Care Collaboration   Collaboration Comments OT eval held pt w/strict bedrest orders in place will follow up once cleared and medically appropriate to participate   Session Information   Date / Session Number  10/18 HOLD   Priority   (needs eval)

## 2022-10-18 NOTE — PROGRESS NOTES
Neurosurgery Consult Note    No events overnight. Still with significant neck pain and low back pain.    Physical Exam:    The patient is resting comfortably in bed in no acute distress.    Neurological  Awake, alert, oriented x3. CN II-XII intact.  C-T-L spine precautions.    Motor  RUE  LUE  Deltoid      5/5     5/5  Biceps         5/5     5/5  Triceps         5/5     5/5  Wrist flexion     5/5     5/5  Wrist extension    5/5     5/5  Finger flexion     5/5     5/5  Interossei     5/5     5/5      RLE  LLE  Iliopsoas     5/5     0/5  Quadriceps     5/5     *BKA  Dorsiflexion                   5/5      Eversion                        5/5    EHL                               5/5       Plantarflexion                 5/5       Hamstrings                     5/5        Sensation  Sensation to light touch intact in all sensory dermatomes in four extremities.    Labs:  Recent Labs     10/17/22  1542 10/18/22  0555   WBC 17.0* 9.2   RBC 2.15* 2.25*   HEMOGLOBIN 6.5* 6.6*   HEMATOCRIT 20.2* 20.4*   MCV 94.0 90.7   MCH 30.2 29.3   MCHC 32.2* 32.4*   RDW 53.9* 54.4*   PLATELETCT 120* 75*   MPV 12.8 12.5       Recent Labs     10/17/22  1542 10/18/22  0555   SODIUM 136 135   POTASSIUM 5.3 5.7*   CHLORIDE 107 107   CO2 21 20   GLUCOSE 128* 139*   BUN 37* 35*   CREATININE 1.08 1.08   CALCIUM 7.7* 7.6*       Recent Labs     10/17/22  1542   APTT 32.5   INR 1.50*       Recent Labs     10/17/22  1835 10/18/22  0555   REACTMIN 4.1* 6.1   CLOTKINET 0.9 1.5   CLOTANGL 77.5 70.1   MAXCLOTS 64.9 58.9   [id number] 0.1 1.0   PRCINADP 90.0* 43.3*   PRCINAA 50.8* 28.0*         Imaging:  CTA neck dated 10/17/2020 was reviewed in detail.  There is no vertebral artery occlusion or dissection.    Assessment and Plan:    Terence Mckeon . is a 72 y.o. male presenting with an unstable 3 column cervical thoracic spine injury in the setting of DISH/ankylosing spondylitis.  There is no evidence of spinal cord injury.  He has an associated  mostly dorsal epidural hematoma that is displacing the spinal cord, but does not appear symptomatic from it. SHRUTI E.  He also has a minor T6-T7 compression fracture and minor L2 burst fracture.    Recommendations:  -Cervical thoracic fracture and ligamentous injury is spanning 3 columns, which could be unstable. I recommend operative fixation with C5-T2 posterior instrumentation, posted for tomorrow 10/19. Continue strict cervical spine precautions with [place] J collar at all times.  -Thoracic fractures: No acute intervention  -L2 burst fracture: Recommend TLSO brace, must be worn when up and out of bed, otherwise okay to sit up in bed without height restriction  - DVT prophylaxis: Would hold DVT prophylaxis for at least 1 to 2 days due to presence of epidural hematoma and cervical spine  - NPO midnight    Thank you for this consult. Please call with questions.    Cyndi Tian M.D.  [facility]  [address] [place] [address]  [phone]    A total of 35 minutes were spent in the evaluation, examination, coordination of care, review of labs and imaging of this patient. I spent >50% of time face-to-face on patient counseling.
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
## 2022-10-18 NOTE — ASSESSMENT & PLAN NOTE
Bilateral T1, right T2 and T3 transverse process fractures. Interruption of confluent syndesmophyte at the T6 and T6-T7 levels, suspect fracture. The T6 vertebral body also shows asymmetric left lateral wedge deformity. Suspect compression fracture.  Non-operative management.   TLSO brace, must be worn when up and out of bed, otherwise okay to sit up in bed without height restriction.  Cyndi Tian M.D., Spine.
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
## 2022-10-18 NOTE — CARE PLAN
The patient is Watcher - Medium risk of patient condition declining or worsening    Shift Goals  Clinical Goals: stable neuro exam, pain control  Patient Goals: pain control, rest  Family Goals: BIJU    Progress made toward(s) clinical / shift goals:      Patient's pain remains controlled with PRN pain medication and repositioning. Skin integrity remains intact with frequent assessment and repositioning.     Problem: Pain - Standard  Goal: Alleviation of pain or a reduction in pain to the patient’s comfort goal  Outcome: Progressing     Problem: Skin Integrity  Goal: Skin integrity is maintained or improved  Outcome: Progressing

## 2022-10-18 NOTE — ASSESSMENT & PLAN NOTE
Morphologic changes of chronic liver disease/cirrhosis.   Stigmata of portal hypertension with enlarged portal vein and splenomegaly.

## 2022-10-18 NOTE — PROCEDURES
DATE OF PROCEDURE:  10/17/2022    PREOPERATIVE DIAGNOSIS:  Left fifth digit laceration     POSTOPERATIVE DIAGNOSIS: Left fifth digit laceration repair    PROCEDURE PERFORMED: Simple repair of laceration (single layer, 3 cm aggregate length).     PROCEDURE PERFORMED BY:  Risa Velasquez P.A.-C.    INDICATIONS: The patient is a 72 y.o. year-old elderly man with 3 cm left finger laceration to tendon.    FINDINGS: There was no tendon laceration noted. The laceration was curved in a small candy cane shape with 0.5 cm exposure of the muscle at the inferior site. Anatomy has minimal soft tissue for a two layer closure.     PROCEDURE: Following informed consent consent, the patient was properly identified and optimally positioned.  The site was infiltrated with local anesthetic, irrigated, and prepped into a sterile field.      The skin was approximated with with interrupted 3-0 VICRYL® Plus Antibacterial sutures. Antibiotic ointment was applied to the wound.    The patient tolerated the procedure well. There were no apparent complications.         ____________________________________     Risa Velasqeuz P.A.-C.    DD: 10/17/2022  11:51 PM
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
## 2022-10-18 NOTE — CARE PLAN
Problem: Knowledge Deficit - Standard  Goal: Patient and family/care givers will demonstrate understanding of plan of care, disease process/condition, diagnostic tests and medications  Outcome: Progressing     Problem: Pain - Standard  Goal: Alleviation of pain or a reduction in pain to the patient’s comfort goal  Outcome: Progressing     Problem: Fall Risk  Goal: Patient will remain free from falls  Outcome: Progressing   The patient is Watcher - Medium risk of patient condition declining or worsening    Shift Goals  Clinical Goals: pain control, stable neuro exams  Patient Goals: pain conrtol; sleep  Family Goals: to be kept informed    Progress made toward(s) clinical / shift goals:  see above      Patient is not progressing towards the following goals:

## 2022-10-18 NOTE — ASSESSMENT & PLAN NOTE
Chronic condition treated with ativan and trazodone.    Holding maintenance medication during acute traumatic illness.

## 2022-10-18 NOTE — ASSESSMENT & PLAN NOTE
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  Resume pre-hospital enteral nutrition.
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
## 2022-10-18 NOTE — ASSESSMENT & PLAN NOTE
Comminuted fracture at C7-T1 with displacement of prominent anterior osteophytes and fractures involving the anterior inferior C7 and anterior superior T1 vertebral bodies. Distracted fracture of the C7 spinous process and small fracture of the left C7 inferior articular facet. Significant epidural hemorrhage within the cervical spinal canal.  CTA neck with no evidence of traumatic dissection or occlusion.  MRI cervical spine with no spinal cord signal abnormality. Epidural hemorrhage.  10/19 C5-T2 posterior fixation.  Repeat CT complete, stable with C3 spinous process fracture now visualized.  Cervical immobilization.  Follow up in 2 weeks for suture removal, 6 weeks with Xrays. Clinic will coordinate follow-up.  Cyndi Tian MD. Neurosurgeon. [facility].
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
## 2022-10-18 NOTE — WOUND TEAM
Wound consult placed on 10/17/22 by Dr. Burleson regarding upper extremity abrasion. MD has already ordered dressing. No advanced wound care needs identified. Wound consult completed.

## 2022-10-18 NOTE — PROGRESS NOTES
RN to speak with Dr. Burleson regarding contrast CT. Pt notes that he has kidney disease, pt unable to specify. RN to notify MD Burleson and report off current labs, will proceed with the CTA and monitor the patient. Pt aware and patient signed consent for contrast with [employer] Espinosa and this RN.     Pt currently in CT.

## 2022-10-18 NOTE — ED NOTES
Med rec updated and complete. Allergies reviewed. Pt unable to recall his current medications. Placed call to home pharmacy to confirm medications. Pt confirmed  last doses taken.      No antibiotics on profile and no allergies listed.        Home pharmacy [facility] [phone]
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
## 2022-10-18 NOTE — ASSESSMENT & PLAN NOTE
L1 acute compression burst fracture.  Non-operative management.  TLSO brace, must be worn when up and out of bed, otherwise okay to sit up in bed without height restriction.  Cyndi Tian MD. Neurosurgeon. [facility].

## 2022-10-18 NOTE — THERAPY
Missed Therapy     Patient Name: Terence Mckeon Jr.  Age:  72 y.o., Sex:  male  Medical Record #: [id number]  Today's Date: 10/18/2022    PT order received, pt on strict bed rest. PT evaluation will be held until pt is cleared for mobility.    Cici Plummer PT, DPT

## 2022-10-18 NOTE — ASSESSMENT & PLAN NOTE
Prophylactic anticoagulation for thrombotic prevention initially contraindicated secondary to elevated bleeding risk.  10/19 Trauma screening bilateral lower extremity venous duplex negative for above knee DVT.  10/21 Prophylactic dose enoxaparin initiated.   Systemic anticoagulation approved by Neurosurgery.

## 2022-10-18 NOTE — PROGRESS NOTES
ICU RN in MRI with patient. RN to speak with nightshift JELLY parada who will be assuming care. NOC RN Will coordinate, CT-CTA and blood transfusion orders.
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
## 2022-10-19 ENCOUNTER — APPOINTMENT (OUTPATIENT)
Dept: RADIOLOGY | Facility: MEDICAL CENTER | Age: 72
DRG: 028 | End: 2022-10-19
Attending: NEUROLOGICAL SURGERY
Payer: OTHER MISCELLANEOUS

## 2022-10-19 ENCOUNTER — ANESTHESIA EVENT (OUTPATIENT)
Dept: SURGERY | Facility: MEDICAL CENTER | Age: 72
DRG: 028 | End: 2022-10-19
Payer: OTHER MISCELLANEOUS

## 2022-10-19 ENCOUNTER — APPOINTMENT (OUTPATIENT)
Dept: RADIOLOGY | Facility: MEDICAL CENTER | Age: 72
DRG: 028 | End: 2022-10-19
Attending: SURGERY
Payer: OTHER MISCELLANEOUS

## 2022-10-19 PROBLEM — J96.01 ACUTE RESPIRATORY FAILURE WITH HYPOXIA (HCC): Status: ACTIVE | Noted: 2022-10-19

## 2022-10-19 LAB
ALBUMIN SERPL BCP-MCNC: 2.9 G/DL (ref 3.2–4.9)
ALBUMIN/GLOB SERPL: 1.2 G/DL
ALP SERPL-CCNC: 84 U/L (ref 30–99)
ALT SERPL-CCNC: 23 U/L (ref 2–50)
ANION GAP SERPL CALC-SCNC: 10 MMOL/L (ref 7–16)
ANION GAP SERPL CALC-SCNC: 7 MMOL/L (ref 7–16)
AST SERPL-CCNC: 54 U/L (ref 12–45)
BARCODED ABORH UBTYP: 600
BARCODED ABORH UBTYP: 6200
BARCODED ABORH UBTYP: 8400
BARCODED PRD CODE UBPRD: NORMAL
BARCODED UNIT NUM UBUNT: NORMAL
BASE EXCESS BLDA CALC-SCNC: -6 MMOL/L (ref -4–3)
BASE EXCESS BLDA CALC-SCNC: -7 MMOL/L (ref -4–3)
BASE EXCESS BLDA CALC-SCNC: -8 MMOL/L (ref -4–3)
BASOPHILS # BLD AUTO: 0.1 % (ref 0–1.8)
BASOPHILS # BLD: 0.01 K/UL (ref 0–0.12)
BILIRUB SERPL-MCNC: 1.5 MG/DL (ref 0.1–1.5)
BODY TEMPERATURE: ABNORMAL DEGREES
BREATHS SETTING VENT: 22
BREATHS SETTING VENT: 22
BUN SERPL-MCNC: 27 MG/DL (ref 8–22)
BUN SERPL-MCNC: 30 MG/DL (ref 8–22)
CA-I BLD ISE-SCNC: 1.04 MMOL/L (ref 1.1–1.3)
CA-I BLD ISE-SCNC: 1.12 MMOL/L (ref 1.1–1.3)
CA-I BLD ISE-SCNC: 1.25 MMOL/L (ref 1.1–1.3)
CALCIUM SERPL-MCNC: 7.5 MG/DL (ref 8.5–10.5)
CALCIUM SERPL-MCNC: 7.8 MG/DL (ref 8.5–10.5)
CFT BLD TEG: 5.3 MIN (ref 4.6–9.1)
CFT P HPASE BLD TEG: 4.9 MIN (ref 4.3–8.3)
CHLORIDE SERPL-SCNC: 108 MMOL/L (ref 96–112)
CHLORIDE SERPL-SCNC: 111 MMOL/L (ref 96–112)
CLOT ANGLE BLD TEG: 70.8 DEGREES (ref 63–78)
CLOT LYSIS 30M P MA LENFR BLD TEG: 0 % (ref 0–2.6)
CO2 BLDA-SCNC: 19 MMOL/L (ref 20–33)
CO2 BLDA-SCNC: 19 MMOL/L (ref 20–33)
CO2 BLDA-SCNC: 21 MMOL/L (ref 20–33)
CO2 BLDA-SCNC: 22 MMOL/L (ref 20–33)
CO2 BLDA-SCNC: 22 MMOL/L (ref 20–33)
CO2 SERPL-SCNC: 18 MMOL/L (ref 20–33)
CO2 SERPL-SCNC: 19 MMOL/L (ref 20–33)
COMPONENT FT 8504FT: NORMAL
COMPONENT P 8504P: NORMAL
COMPONENT R 8504R: NORMAL
CREAT SERPL-MCNC: 0.91 MG/DL (ref 0.5–1.4)
CREAT SERPL-MCNC: 0.96 MG/DL (ref 0.5–1.4)
CRP SERPL HS-MCNC: 9.65 MG/DL (ref 0–0.75)
CT.EXTRINSIC BLD ROTEM: 1.8 MIN (ref 0.8–2.1)
DELSYS IDSYS: ABNORMAL
DELSYS IDSYS: ABNORMAL
EKG IMPRESSION: NORMAL
END TIDAL CARBON DIOXIDE IECO2: 30 MMHG
EOSINOPHIL # BLD AUTO: 0.03 K/UL (ref 0–0.51)
EOSINOPHIL NFR BLD: 0.4 % (ref 0–6.9)
ERYTHROCYTE [DISTWIDTH] IN BLOOD BY AUTOMATED COUNT: 45.8 FL (ref 35.9–50)
ERYTHROCYTE [DISTWIDTH] IN BLOOD BY AUTOMATED COUNT: 47.6 FL (ref 35.9–50)
ERYTHROCYTE [DISTWIDTH] IN BLOOD BY AUTOMATED COUNT: 50.7 FL (ref 35.9–50)
GFR SERPLBLD CREATININE-BSD FMLA CKD-EPI: 84 ML/MIN/1.73 M 2
GFR SERPLBLD CREATININE-BSD FMLA CKD-EPI: 90 ML/MIN/1.73 M 2
GLOBULIN SER CALC-MCNC: 2.5 G/DL (ref 1.9–3.5)
GLUCOSE SERPL-MCNC: 118 MG/DL (ref 65–99)
GLUCOSE SERPL-MCNC: 94 MG/DL (ref 65–99)
HCO3 BLDA-SCNC: 17.8 MMOL/L (ref 17–25)
HCO3 BLDA-SCNC: 17.9 MMOL/L (ref 17–25)
HCO3 BLDA-SCNC: 19.9 MMOL/L (ref 17–25)
HCO3 BLDA-SCNC: 20.5 MMOL/L (ref 17–25)
HCO3 BLDA-SCNC: 20.7 MMOL/L (ref 17–25)
HCT VFR BLD AUTO: 21 % (ref 42–52)
HCT VFR BLD AUTO: 24.2 % (ref 42–52)
HCT VFR BLD AUTO: 28.2 % (ref 42–52)
HCT VFR BLD CALC: 17 % (ref 42–52)
HCT VFR BLD CALC: 19 % (ref 42–52)
HCT VFR BLD CALC: 23 % (ref 42–52)
HGB BLD-MCNC: 5.8 G/DL (ref 14–18)
HGB BLD-MCNC: 6.5 G/DL (ref 14–18)
HGB BLD-MCNC: 6.8 G/DL (ref 14–18)
HGB BLD-MCNC: 7.8 G/DL (ref 14–18)
HGB BLD-MCNC: 8.2 G/DL (ref 14–18)
HGB BLD-MCNC: 9.3 G/DL (ref 14–18)
HOROWITZ INDEX BLDA+IHG-RTO: 320 MM[HG]
HOROWITZ INDEX BLDA+IHG-RTO: 357 MM[HG]
IMM GRANULOCYTES # BLD AUTO: 0.06 K/UL (ref 0–0.11)
IMM GRANULOCYTES NFR BLD AUTO: 0.9 % (ref 0–0.9)
LACTATE SERPL-SCNC: 1.6 MMOL/L (ref 0.5–2)
LYMPHOCYTES # BLD AUTO: 0.3 K/UL (ref 1–4.8)
LYMPHOCYTES NFR BLD: 4.4 % (ref 22–41)
MAGNESIUM SERPL-MCNC: 2 MG/DL (ref 1.5–2.5)
MCF BLD TEG: 50.9 MM (ref 52–69)
MCF.PLATELET INHIB BLD ROTEM: 16.6 MM (ref 15–32)
MCH RBC QN AUTO: 29.4 PG (ref 27–33)
MCH RBC QN AUTO: 29.7 PG (ref 27–33)
MCH RBC QN AUTO: 29.7 PG (ref 27–33)
MCHC RBC AUTO-ENTMCNC: 32.4 G/DL (ref 33.7–35.3)
MCHC RBC AUTO-ENTMCNC: 33 G/DL (ref 33.7–35.3)
MCHC RBC AUTO-ENTMCNC: 33.9 G/DL (ref 33.7–35.3)
MCV RBC AUTO: 87.7 FL (ref 81.4–97.8)
MCV RBC AUTO: 90.1 FL (ref 81.4–97.8)
MCV RBC AUTO: 90.9 FL (ref 81.4–97.8)
MODE IMODE: ABNORMAL
MODE IMODE: ABNORMAL
MONOCYTES # BLD AUTO: 0.4 K/UL (ref 0–0.85)
MONOCYTES NFR BLD AUTO: 5.9 % (ref 0–13.4)
NEUTROPHILS # BLD AUTO: 5.95 K/UL (ref 1.82–7.42)
NEUTROPHILS NFR BLD: 88.3 % (ref 44–72)
NRBC # BLD AUTO: 0 K/UL
NRBC BLD-RTO: 0 /100 WBC
O2/TOTAL GAS SETTING VFR VENT: 30 %
O2/TOTAL GAS SETTING VFR VENT: 30 %
PA AA BLD-ACNC: 31.3 % (ref 0–11)
PA ADP BLD-ACNC: 36.8 % (ref 0–17)
PCO2 BLDA: 30.3 MMHG (ref 26–37)
PCO2 BLDA: 31.9 MMHG (ref 26–37)
PCO2 BLDA: 46.4 MMHG (ref 26–37)
PCO2 BLDA: 49.2 MMHG (ref 26–37)
PCO2 BLDA: 49.8 MMHG (ref 26–37)
PCO2 TEMP ADJ BLDA: 30.2 MMHG (ref 26–37)
PCO2 TEMP ADJ BLDA: 30.7 MMHG (ref 26–37)
PCO2 TEMP ADJ BLDA: 47.6 MMHG (ref 26–37)
PEEP END EXPIRATORY PRESSURE IPEEP: 8 CMH20
PEEP END EXPIRATORY PRESSURE IPEEP: 8 CMH20
PH BLDA: 7.21 [PH] (ref 7.4–7.5)
PH BLDA: 7.22 [PH] (ref 7.4–7.5)
PH BLDA: 7.26 [PH] (ref 7.4–7.5)
PH BLDA: 7.36 [PH] (ref 7.4–7.5)
PH BLDA: 7.38 [PH] (ref 7.4–7.5)
PH TEMP ADJ BLDA: 7.24 [PH] (ref 7.4–7.5)
PH TEMP ADJ BLDA: 7.37 [PH] (ref 7.4–7.5)
PH TEMP ADJ BLDA: 7.38 [PH] (ref 7.4–7.5)
PLATELET # BLD AUTO: 41 K/UL (ref 164–446)
PLATELET # BLD AUTO: 49 K/UL (ref 164–446)
PLATELET # BLD AUTO: 54 K/UL (ref 164–446)
PMV BLD AUTO: 12 FL (ref 9–12.9)
PMV BLD AUTO: 12 FL (ref 9–12.9)
PMV BLD AUTO: 12.6 FL (ref 9–12.9)
PO2 BLDA: 107 MMHG (ref 64–87)
PO2 BLDA: 238 MMHG (ref 64–87)
PO2 BLDA: 257 MMHG (ref 64–87)
PO2 BLDA: 292 MMHG (ref 64–87)
PO2 BLDA: 96 MMHG (ref 64–87)
PO2 TEMP ADJ BLDA: 106 MMHG (ref 64–87)
PO2 TEMP ADJ BLDA: 287 MMHG (ref 64–87)
PO2 TEMP ADJ BLDA: 91 MMHG (ref 64–87)
POTASSIUM BLD-SCNC: 4.6 MMOL/L (ref 3.6–5.5)
POTASSIUM BLD-SCNC: 5.2 MMOL/L (ref 3.6–5.5)
POTASSIUM BLD-SCNC: 5.2 MMOL/L (ref 3.6–5.5)
POTASSIUM SERPL-SCNC: 4.9 MMOL/L (ref 3.6–5.5)
POTASSIUM SERPL-SCNC: 5.3 MMOL/L (ref 3.6–5.5)
PREALB SERPL-MCNC: 8.7 MG/DL (ref 18–38)
PRODUCT TYPE UPROD: NORMAL
PROT SERPL-MCNC: 5.4 G/DL (ref 6–8.2)
RBC # BLD AUTO: 2.31 M/UL (ref 4.7–6.1)
RBC # BLD AUTO: 2.76 M/UL (ref 4.7–6.1)
RBC # BLD AUTO: 3.13 M/UL (ref 4.7–6.1)
SAO2 % BLDA: 100 % (ref 93–99)
SAO2 % BLDA: 97 % (ref 93–99)
SAO2 % BLDA: 98 % (ref 93–99)
SODIUM BLD-SCNC: 135 MMOL/L (ref 135–145)
SODIUM BLD-SCNC: 135 MMOL/L (ref 135–145)
SODIUM BLD-SCNC: 139 MMOL/L (ref 135–145)
SODIUM SERPL-SCNC: 136 MMOL/L (ref 135–145)
SODIUM SERPL-SCNC: 137 MMOL/L (ref 135–145)
SPECIMEN DRAWN FROM PATIENT: ABNORMAL
TEG ALGORITHM TGALG: ABNORMAL
TIDAL VOLUME IVT: 410 ML
TIDAL VOLUME IVT: 410 ML
TRIGL SERPL-MCNC: 73 MG/DL (ref 0–149)
UNIT STATUS USTAT: NORMAL
WBC # BLD AUTO: 4.3 K/UL (ref 4.8–10.8)
WBC # BLD AUTO: 6.3 K/UL (ref 4.8–10.8)
WBC # BLD AUTO: 6.8 K/UL (ref 4.8–10.8)

## 2022-10-19 PROCEDURE — 85014 HEMATOCRIT: CPT

## 2022-10-19 PROCEDURE — C1713 ANCHOR/SCREW BN/BN,TIS/BN: HCPCS | Performed by: NEUROLOGICAL SURGERY

## 2022-10-19 PROCEDURE — 00670 ANES XTNSV SP&SPI CORD PX: CPT | Performed by: ANESTHESIOLOGY

## 2022-10-19 PROCEDURE — 700102 HCHG RX REV CODE 250 W/ 637 OVERRIDE(OP): Performed by: SURGERY

## 2022-10-19 PROCEDURE — 83605 ASSAY OF LACTIC ACID: CPT

## 2022-10-19 PROCEDURE — 72040 X-RAY EXAM NECK SPINE 2-3 VW: CPT

## 2022-10-19 PROCEDURE — 94799 UNLISTED PULMONARY SVC/PX: CPT

## 2022-10-19 PROCEDURE — 700111 HCHG RX REV CODE 636 W/ 250 OVERRIDE (IP): Performed by: SURGERY

## 2022-10-19 PROCEDURE — C9113 INJ PANTOPRAZOLE SODIUM, VIA: HCPCS | Performed by: SURGERY

## 2022-10-19 PROCEDURE — A9270 NON-COVERED ITEM OR SERVICE: HCPCS | Performed by: SURGERY

## 2022-10-19 PROCEDURE — 700105 HCHG RX REV CODE 258: Performed by: SURGERY

## 2022-10-19 PROCEDURE — 85576 BLOOD PLATELET AGGREGATION: CPT | Mod: 91

## 2022-10-19 PROCEDURE — 84134 ASSAY OF PREALBUMIN: CPT

## 2022-10-19 PROCEDURE — 93010 ELECTROCARDIOGRAM REPORT: CPT | Performed by: INTERNAL MEDICINE

## 2022-10-19 PROCEDURE — 700101 HCHG RX REV CODE 250: Performed by: NEUROLOGICAL SURGERY

## 2022-10-19 PROCEDURE — 93005 ELECTROCARDIOGRAM TRACING: CPT | Performed by: NEUROLOGICAL SURGERY

## 2022-10-19 PROCEDURE — 700105 HCHG RX REV CODE 258: Performed by: CLINICAL NURSE SPECIALIST

## 2022-10-19 PROCEDURE — 80048 BASIC METABOLIC PNL TOTAL CA: CPT

## 2022-10-19 PROCEDURE — 82803 BLOOD GASES ANY COMBINATION: CPT | Mod: 91

## 2022-10-19 PROCEDURE — P9034 PLATELETS, PHERESIS: HCPCS

## 2022-10-19 PROCEDURE — 99291 CRITICAL CARE FIRST HOUR: CPT | Performed by: SURGERY

## 2022-10-19 PROCEDURE — 700101 HCHG RX REV CODE 250: Performed by: ANESTHESIOLOGY

## 2022-10-19 PROCEDURE — 700111 HCHG RX REV CODE 636 W/ 250 OVERRIDE (IP): Performed by: ANESTHESIOLOGY

## 2022-10-19 PROCEDURE — 84295 ASSAY OF SERUM SODIUM: CPT

## 2022-10-19 PROCEDURE — 160042 HCHG SURGERY MINUTES - EA ADDL 1 MIN LEVEL 5: Performed by: NEUROLOGICAL SURGERY

## 2022-10-19 PROCEDURE — 30233R1 TRANSFUSION OF NONAUTOLOGOUS PLATELETS INTO PERIPHERAL VEIN, PERCUTANEOUS APPROACH: ICD-10-PCS | Performed by: ANESTHESIOLOGY

## 2022-10-19 PROCEDURE — 700102 HCHG RX REV CODE 250 W/ 637 OVERRIDE(OP)

## 2022-10-19 PROCEDURE — A9270 NON-COVERED ITEM OR SERVICE: HCPCS | Performed by: CLINICAL NURSE SPECIALIST

## 2022-10-19 PROCEDURE — 0RG60K1 FUSION OF THORACIC VERTEBRAL JOINT WITH NONAUTOLOGOUS TISSUE SUBSTITUTE, POSTERIOR APPROACH, POSTERIOR COLUMN, OPEN APPROACH: ICD-10-PCS | Performed by: NEUROLOGICAL SURGERY

## 2022-10-19 PROCEDURE — 84478 ASSAY OF TRIGLYCERIDES: CPT

## 2022-10-19 PROCEDURE — 86923 COMPATIBILITY TEST ELECTRIC: CPT | Mod: 91

## 2022-10-19 PROCEDURE — 94003 VENT MGMT INPAT SUBQ DAY: CPT

## 2022-10-19 PROCEDURE — P9016 RBC LEUKOCYTES REDUCED: HCPCS | Mod: 91

## 2022-10-19 PROCEDURE — 30233L1 TRANSFUSION OF NONAUTOLOGOUS FRESH PLASMA INTO PERIPHERAL VEIN, PERCUTANEOUS APPROACH: ICD-10-PCS | Performed by: ANESTHESIOLOGY

## 2022-10-19 PROCEDURE — 93970 EXTREMITY STUDY: CPT

## 2022-10-19 PROCEDURE — 36430 TRANSFUSION BLD/BLD COMPNT: CPT

## 2022-10-19 PROCEDURE — 83735 ASSAY OF MAGNESIUM: CPT

## 2022-10-19 PROCEDURE — 36620 INSERTION CATHETER ARTERY: CPT | Performed by: ANESTHESIOLOGY

## 2022-10-19 PROCEDURE — 85384 FIBRINOGEN ACTIVITY: CPT

## 2022-10-19 PROCEDURE — 700111 HCHG RX REV CODE 636 W/ 250 OVERRIDE (IP): Performed by: CLINICAL NURSE SPECIALIST

## 2022-10-19 PROCEDURE — 502000 HCHG MISC OR IMPLANTS RC 0278: Performed by: NEUROLOGICAL SURGERY

## 2022-10-19 PROCEDURE — 700105 HCHG RX REV CODE 258: Performed by: ANESTHESIOLOGY

## 2022-10-19 PROCEDURE — 700102 HCHG RX REV CODE 250 W/ 637 OVERRIDE(OP): Performed by: ANESTHESIOLOGY

## 2022-10-19 PROCEDURE — 0RG20K1 FUSION OF 2 OR MORE CERVICAL VERTEBRAL JOINTS WITH NONAUTOLOGOUS TISSUE SUBSTITUTE, POSTERIOR APPROACH, POSTERIOR COLUMN, OPEN APPROACH: ICD-10-PCS | Performed by: NEUROLOGICAL SURGERY

## 2022-10-19 PROCEDURE — 770022 HCHG ROOM/CARE - ICU (200)

## 2022-10-19 PROCEDURE — 160009 HCHG ANES TIME/MIN: Performed by: NEUROLOGICAL SURGERY

## 2022-10-19 PROCEDURE — 82330 ASSAY OF CALCIUM: CPT

## 2022-10-19 PROCEDURE — 85347 COAGULATION TIME ACTIVATED: CPT

## 2022-10-19 PROCEDURE — 0RG40K1 FUSION OF CERVICOTHORACIC VERTEBRAL JOINT WITH NONAUTOLOGOUS TISSUE SUBSTITUTE, POSTERIOR APPROACH, POSTERIOR COLUMN, OPEN APPROACH: ICD-10-PCS | Performed by: NEUROLOGICAL SURGERY

## 2022-10-19 PROCEDURE — 85027 COMPLETE CBC AUTOMATED: CPT | Mod: 91

## 2022-10-19 PROCEDURE — 99140 ANES COMP EMERGENCY COND: CPT | Performed by: ANESTHESIOLOGY

## 2022-10-19 PROCEDURE — 94002 VENT MGMT INPAT INIT DAY: CPT

## 2022-10-19 PROCEDURE — 99100 ANES PT EXTEME AGE<1 YR&>70: CPT | Performed by: ANESTHESIOLOGY

## 2022-10-19 PROCEDURE — 80053 COMPREHEN METABOLIC PANEL: CPT

## 2022-10-19 PROCEDURE — 700102 HCHG RX REV CODE 250 W/ 637 OVERRIDE(OP): Performed by: CLINICAL NURSE SPECIALIST

## 2022-10-19 PROCEDURE — 160031 HCHG SURGERY MINUTES - 1ST 30 MINS LEVEL 5: Performed by: NEUROLOGICAL SURGERY

## 2022-10-19 PROCEDURE — 700111 HCHG RX REV CODE 636 W/ 250 OVERRIDE (IP): Performed by: NEUROLOGICAL SURGERY

## 2022-10-19 PROCEDURE — 86140 C-REACTIVE PROTEIN: CPT

## 2022-10-19 PROCEDURE — 84132 ASSAY OF SERUM POTASSIUM: CPT | Mod: 91

## 2022-10-19 PROCEDURE — 160048 HCHG OR STATISTICAL LEVEL 1-5: Performed by: NEUROLOGICAL SURGERY

## 2022-10-19 PROCEDURE — P9017 PLASMA 1 DONOR FRZ W/IN 8 HR: HCPCS

## 2022-10-19 PROCEDURE — 110371 HCHG SHELL REV 272: Performed by: NEUROLOGICAL SURGERY

## 2022-10-19 PROCEDURE — 71045 X-RAY EXAM CHEST 1 VIEW: CPT

## 2022-10-19 PROCEDURE — 85025 COMPLETE CBC W/AUTO DIFF WBC: CPT

## 2022-10-19 DEVICE — GRAFT BONE MAGNIFUSE 1X10CM: Type: IMPLANTABLE DEVICE | Site: BACK | Status: FUNCTIONAL

## 2022-10-19 DEVICE — SCREW SET INFINITY (1EA): Type: IMPLANTABLE DEVICE | Site: BACK | Status: FUNCTIONAL

## 2022-10-19 DEVICE — IMPLANTABLE DEVICE: Type: IMPLANTABLE DEVICE | Site: BACK | Status: FUNCTIONAL

## 2022-10-19 DEVICE — ORTHOBLEND 10CC: Type: IMPLANTABLE DEVICE | Site: BACK | Status: FUNCTIONAL

## 2022-10-19 RX ORDER — OXYCODONE HCL 5 MG/5 ML
10 SOLUTION, ORAL ORAL
Status: DISCONTINUED | OUTPATIENT
Start: 2022-10-19 | End: 2022-10-19 | Stop reason: HOSPADM

## 2022-10-19 RX ORDER — ACETAMINOPHEN 325 MG/1
650 TABLET ORAL EVERY 6 HOURS
Status: DISPENSED | OUTPATIENT
Start: 2022-10-20 | End: 2022-10-22

## 2022-10-19 RX ORDER — OXYCODONE HYDROCHLORIDE 10 MG/1
10 TABLET ORAL
Status: DISCONTINUED | OUTPATIENT
Start: 2022-10-19 | End: 2022-11-01 | Stop reason: HOSPADM

## 2022-10-19 RX ORDER — HYDROMORPHONE HYDROCHLORIDE 1 MG/ML
0.1 INJECTION, SOLUTION INTRAMUSCULAR; INTRAVENOUS; SUBCUTANEOUS
Status: DISCONTINUED | OUTPATIENT
Start: 2022-10-19 | End: 2022-10-19 | Stop reason: HOSPADM

## 2022-10-19 RX ORDER — HYDRALAZINE HYDROCHLORIDE 20 MG/ML
5 INJECTION INTRAMUSCULAR; INTRAVENOUS
Status: DISCONTINUED | OUTPATIENT
Start: 2022-10-19 | End: 2022-10-19

## 2022-10-19 RX ORDER — DIPHENHYDRAMINE HYDROCHLORIDE 50 MG/ML
12.5 INJECTION INTRAMUSCULAR; INTRAVENOUS
Status: DISCONTINUED | OUTPATIENT
Start: 2022-10-19 | End: 2022-10-19

## 2022-10-19 RX ORDER — OXYCODONE HYDROCHLORIDE 5 MG/1
5 TABLET ORAL
Status: DISCONTINUED | OUTPATIENT
Start: 2022-10-19 | End: 2022-11-01 | Stop reason: HOSPADM

## 2022-10-19 RX ORDER — CEFAZOLIN SODIUM 1 G/3ML
INJECTION, POWDER, FOR SOLUTION INTRAMUSCULAR; INTRAVENOUS
Status: DISCONTINUED | OUTPATIENT
Start: 2022-10-19 | End: 2022-10-19

## 2022-10-19 RX ORDER — ONDANSETRON 2 MG/ML
4 INJECTION INTRAMUSCULAR; INTRAVENOUS
Status: DISCONTINUED | OUTPATIENT
Start: 2022-10-19 | End: 2022-10-19

## 2022-10-19 RX ORDER — FUROSEMIDE 10 MG/ML
INJECTION INTRAMUSCULAR; INTRAVENOUS PRN
Status: DISCONTINUED | OUTPATIENT
Start: 2022-10-19 | End: 2022-10-19 | Stop reason: SURG

## 2022-10-19 RX ORDER — SODIUM CHLORIDE 9 MG/ML
500 INJECTION, SOLUTION INTRAVENOUS ONCE
Status: COMPLETED | OUTPATIENT
Start: 2022-10-19 | End: 2022-10-20

## 2022-10-19 RX ORDER — LIDOCAINE HYDROCHLORIDE 20 MG/ML
INJECTION, SOLUTION EPIDURAL; INFILTRATION; INTRACAUDAL; PERINEURAL PRN
Status: DISCONTINUED | OUTPATIENT
Start: 2022-10-19 | End: 2022-10-19 | Stop reason: SURG

## 2022-10-19 RX ORDER — SODIUM CHLORIDE, SODIUM LACTATE, POTASSIUM CHLORIDE, CALCIUM CHLORIDE 600; 310; 30; 20 MG/100ML; MG/100ML; MG/100ML; MG/100ML
INJECTION, SOLUTION INTRAVENOUS
Status: DISCONTINUED | OUTPATIENT
Start: 2022-10-19 | End: 2022-10-19 | Stop reason: SURG

## 2022-10-19 RX ORDER — ROCURONIUM BROMIDE 10 MG/ML
INJECTION, SOLUTION INTRAVENOUS PRN
Status: DISCONTINUED | OUTPATIENT
Start: 2022-10-19 | End: 2022-10-19 | Stop reason: SURG

## 2022-10-19 RX ORDER — BUPIVACAINE HYDROCHLORIDE AND EPINEPHRINE 5; 5 MG/ML; UG/ML
INJECTION, SOLUTION PERINEURAL
Status: DISCONTINUED | OUTPATIENT
Start: 2022-10-19 | End: 2022-10-19

## 2022-10-19 RX ORDER — ONDANSETRON 2 MG/ML
INJECTION INTRAMUSCULAR; INTRAVENOUS PRN
Status: DISCONTINUED | OUTPATIENT
Start: 2022-10-19 | End: 2022-10-19 | Stop reason: SURG

## 2022-10-19 RX ORDER — SODIUM CHLORIDE, SODIUM LACTATE, POTASSIUM CHLORIDE, CALCIUM CHLORIDE 600; 310; 30; 20 MG/100ML; MG/100ML; MG/100ML; MG/100ML
INJECTION, SOLUTION INTRAVENOUS CONTINUOUS
Status: DISCONTINUED | OUTPATIENT
Start: 2022-10-19 | End: 2022-10-19

## 2022-10-19 RX ORDER — SODIUM CHLORIDE, SODIUM GLUCONATE, SODIUM ACETATE, POTASSIUM CHLORIDE AND MAGNESIUM CHLORIDE 526; 502; 368; 37; 30 MG/100ML; MG/100ML; MG/100ML; MG/100ML; MG/100ML
INJECTION, SOLUTION INTRAVENOUS
Status: DISCONTINUED | OUTPATIENT
Start: 2022-10-19 | End: 2022-10-19 | Stop reason: SURG

## 2022-10-19 RX ORDER — CALCIUM CHLORIDE 100 MG/ML
INJECTION INTRAVENOUS; INTRAVENTRICULAR PRN
Status: DISCONTINUED | OUTPATIENT
Start: 2022-10-19 | End: 2022-10-19 | Stop reason: SURG

## 2022-10-19 RX ORDER — SODIUM CHLORIDE 9 MG/ML
INJECTION, SOLUTION INTRAVENOUS
Status: DISCONTINUED | OUTPATIENT
Start: 2022-10-19 | End: 2022-10-19 | Stop reason: SURG

## 2022-10-19 RX ORDER — DEXAMETHASONE SODIUM PHOSPHATE 4 MG/ML
INJECTION, SOLUTION INTRA-ARTICULAR; INTRALESIONAL; INTRAMUSCULAR; INTRAVENOUS; SOFT TISSUE PRN
Status: DISCONTINUED | OUTPATIENT
Start: 2022-10-19 | End: 2022-10-19 | Stop reason: SURG

## 2022-10-19 RX ORDER — MIDAZOLAM HYDROCHLORIDE 1 MG/ML
INJECTION INTRAMUSCULAR; INTRAVENOUS PRN
Status: DISCONTINUED | OUTPATIENT
Start: 2022-10-19 | End: 2022-10-19 | Stop reason: SURG

## 2022-10-19 RX ORDER — FAMOTIDINE 20 MG/1
20 TABLET, FILM COATED ORAL 2 TIMES DAILY
Status: DISCONTINUED | OUTPATIENT
Start: 2022-10-19 | End: 2022-10-19

## 2022-10-19 RX ORDER — HYDROMORPHONE HYDROCHLORIDE 1 MG/ML
0.4 INJECTION, SOLUTION INTRAMUSCULAR; INTRAVENOUS; SUBCUTANEOUS
Status: DISCONTINUED | OUTPATIENT
Start: 2022-10-19 | End: 2022-10-19 | Stop reason: HOSPADM

## 2022-10-19 RX ORDER — HYDROMORPHONE HYDROCHLORIDE 1 MG/ML
0.2 INJECTION, SOLUTION INTRAMUSCULAR; INTRAVENOUS; SUBCUTANEOUS
Status: DISCONTINUED | OUTPATIENT
Start: 2022-10-19 | End: 2022-10-19 | Stop reason: HOSPADM

## 2022-10-19 RX ORDER — LABETALOL HYDROCHLORIDE 5 MG/ML
5 INJECTION, SOLUTION INTRAVENOUS
Status: DISCONTINUED | OUTPATIENT
Start: 2022-10-19 | End: 2022-10-19 | Stop reason: HOSPADM

## 2022-10-19 RX ORDER — HALOPERIDOL 5 MG/ML
1 INJECTION INTRAMUSCULAR
Status: DISCONTINUED | OUTPATIENT
Start: 2022-10-19 | End: 2022-10-19

## 2022-10-19 RX ORDER — CEFAZOLIN SODIUM 1 G/3ML
INJECTION, POWDER, FOR SOLUTION INTRAMUSCULAR; INTRAVENOUS PRN
Status: DISCONTINUED | OUTPATIENT
Start: 2022-10-19 | End: 2022-10-19 | Stop reason: SURG

## 2022-10-19 RX ORDER — DEXTROSE MONOHYDRATE 25 G/50ML
INJECTION, SOLUTION INTRAVENOUS
Status: DISCONTINUED | OUTPATIENT
Start: 2022-10-19 | End: 2022-10-19 | Stop reason: SURG

## 2022-10-19 RX ORDER — ACETAMINOPHEN 325 MG/1
650 TABLET ORAL EVERY 6 HOURS PRN
Status: DISCONTINUED | OUTPATIENT
Start: 2022-10-22 | End: 2022-11-01 | Stop reason: HOSPADM

## 2022-10-19 RX ORDER — OXYCODONE HCL 5 MG/5 ML
5 SOLUTION, ORAL ORAL
Status: DISCONTINUED | OUTPATIENT
Start: 2022-10-19 | End: 2022-10-19 | Stop reason: HOSPADM

## 2022-10-19 RX ORDER — HYDROMORPHONE HYDROCHLORIDE 1 MG/ML
1 INJECTION, SOLUTION INTRAMUSCULAR; INTRAVENOUS; SUBCUTANEOUS
Status: DISCONTINUED | OUTPATIENT
Start: 2022-10-19 | End: 2022-10-21

## 2022-10-19 RX ADMIN — MIDAZOLAM HYDROCHLORIDE 2 MG: 1 INJECTION, SOLUTION INTRAMUSCULAR; INTRAVENOUS at 07:44

## 2022-10-19 RX ADMIN — PROPOFOL 80 MG: 10 INJECTION, EMULSION INTRAVENOUS at 07:51

## 2022-10-19 RX ADMIN — HYDROMORPHONE HYDROCHLORIDE 0.5 MG: 1 INJECTION, SOLUTION INTRAMUSCULAR; INTRAVENOUS; SUBCUTANEOUS at 02:22

## 2022-10-19 RX ADMIN — LIDOCAINE HYDROCHLORIDE 50 MG: 20 INJECTION, SOLUTION EPIDURAL; INFILTRATION; INTRACAUDAL at 07:51

## 2022-10-19 RX ADMIN — HYDROMORPHONE HYDROCHLORIDE 1 MG: 1 INJECTION, SOLUTION INTRAMUSCULAR; INTRAVENOUS; SUBCUTANEOUS at 15:25

## 2022-10-19 RX ADMIN — FENTANYL CITRATE 50 MCG: 50 INJECTION, SOLUTION INTRAMUSCULAR; INTRAVENOUS at 08:22

## 2022-10-19 RX ADMIN — CEFAZOLIN 2 G: 2 INJECTION, POWDER, FOR SOLUTION INTRAMUSCULAR; INTRAVENOUS at 15:47

## 2022-10-19 RX ADMIN — ROCURONIUM BROMIDE 50 MG: 10 INJECTION, SOLUTION INTRAVENOUS at 11:10

## 2022-10-19 RX ADMIN — GABAPENTIN 100 MG: 100 CAPSULE ORAL at 15:47

## 2022-10-19 RX ADMIN — METAXALONE 400 MG: 800 TABLET ORAL at 05:40

## 2022-10-19 RX ADMIN — SODIUM CHLORIDE, POTASSIUM CHLORIDE, SODIUM LACTATE AND CALCIUM CHLORIDE: 600; 310; 30; 20 INJECTION, SOLUTION INTRAVENOUS at 07:44

## 2022-10-19 RX ADMIN — OXYCODONE HYDROCHLORIDE 10 MG: 10 TABLET ORAL at 00:42

## 2022-10-19 RX ADMIN — PROPOFOL 30 MCG/KG/MIN: 10 INJECTION, EMULSION INTRAVENOUS at 14:21

## 2022-10-19 RX ADMIN — INSULIN HUMAN 10 UNITS: 100 INJECTION, SOLUTION PARENTERAL at 09:48

## 2022-10-19 RX ADMIN — DESMOPRESSIN ACETATE 16.44 MCG: 4 SOLUTION INTRAVENOUS at 08:30

## 2022-10-19 RX ADMIN — ROCURONIUM BROMIDE 50 MG: 10 INJECTION, SOLUTION INTRAVENOUS at 08:35

## 2022-10-19 RX ADMIN — SENNOSIDES AND DOCUSATE SODIUM 1 TABLET: 50; 8.6 TABLET ORAL at 20:38

## 2022-10-19 RX ADMIN — PROPOFOL 50 MG: 10 INJECTION, EMULSION INTRAVENOUS at 07:56

## 2022-10-19 RX ADMIN — OXYCODONE HYDROCHLORIDE 10 MG: 10 TABLET ORAL at 17:22

## 2022-10-19 RX ADMIN — FUROSEMIDE 20 MG: 10 INJECTION, SOLUTION INTRAMUSCULAR; INTRAVENOUS at 09:45

## 2022-10-19 RX ADMIN — DEXAMETHASONE SODIUM PHOSPHATE 10 MG: 4 INJECTION, SOLUTION INTRA-ARTICULAR; INTRALESIONAL; INTRAMUSCULAR; INTRAVENOUS; SOFT TISSUE at 08:27

## 2022-10-19 RX ADMIN — DOCUSATE SODIUM 100 MG: 50 LIQUID ORAL at 05:40

## 2022-10-19 RX ADMIN — CEFAZOLIN 2 G: 330 INJECTION, POWDER, FOR SOLUTION INTRAMUSCULAR; INTRAVENOUS at 08:27

## 2022-10-19 RX ADMIN — PANTOPRAZOLE SODIUM 40 MG: 40 INJECTION, POWDER, LYOPHILIZED, FOR SOLUTION INTRAVENOUS at 05:40

## 2022-10-19 RX ADMIN — SODIUM CHLORIDE 500 ML: 9 INJECTION, SOLUTION INTRAVENOUS at 21:55

## 2022-10-19 RX ADMIN — GABAPENTIN 100 MG: 100 CAPSULE ORAL at 21:54

## 2022-10-19 RX ADMIN — FENTANYL CITRATE 100 MCG: 50 INJECTION, SOLUTION INTRAMUSCULAR; INTRAVENOUS at 11:45

## 2022-10-19 RX ADMIN — ROCURONIUM BROMIDE 50 MG: 10 INJECTION, SOLUTION INTRAVENOUS at 07:51

## 2022-10-19 RX ADMIN — SODIUM CHLORIDE 250 MG: 9 INJECTION, SOLUTION INTRAVENOUS at 17:20

## 2022-10-19 RX ADMIN — SODIUM CHLORIDE, POTASSIUM CHLORIDE, SODIUM LACTATE AND CALCIUM CHLORIDE 1000 ML: 600; 310; 30; 20 INJECTION, SOLUTION INTRAVENOUS at 13:38

## 2022-10-19 RX ADMIN — ROCURONIUM BROMIDE 50 MG: 10 INJECTION, SOLUTION INTRAVENOUS at 09:59

## 2022-10-19 RX ADMIN — FUROSEMIDE 20 MG: 10 INJECTION, SOLUTION INTRAMUSCULAR; INTRAVENOUS at 10:02

## 2022-10-19 RX ADMIN — HYDROMORPHONE HYDROCHLORIDE 1 MG: 1 INJECTION, SOLUTION INTRAMUSCULAR; INTRAVENOUS; SUBCUTANEOUS at 18:11

## 2022-10-19 RX ADMIN — DOCUSATE SODIUM 100 MG: 50 LIQUID ORAL at 17:20

## 2022-10-19 RX ADMIN — SODIUM CHLORIDE: 9 INJECTION, SOLUTION INTRAVENOUS at 09:40

## 2022-10-19 RX ADMIN — ONDANSETRON 4 MG: 2 INJECTION INTRAMUSCULAR; INTRAVENOUS at 11:45

## 2022-10-19 RX ADMIN — FENTANYL CITRATE 50 MCG: 50 INJECTION, SOLUTION INTRAMUSCULAR; INTRAVENOUS at 11:55

## 2022-10-19 RX ADMIN — GABAPENTIN 100 MG: 100 CAPSULE ORAL at 05:40

## 2022-10-19 RX ADMIN — CALCIUM CHLORIDE 1 G: 100 INJECTION INTRAVENOUS; INTRAVENTRICULAR at 10:02

## 2022-10-19 RX ADMIN — SODIUM CHLORIDE, SODIUM GLUCONATE, SODIUM ACETATE, POTASSIUM CHLORIDE AND MAGNESIUM CHLORIDE: 526; 502; 368; 37; 30 INJECTION, SOLUTION INTRAVENOUS at 08:25

## 2022-10-19 RX ADMIN — FENTANYL CITRATE 50 MCG: 50 INJECTION, SOLUTION INTRAMUSCULAR; INTRAVENOUS at 08:32

## 2022-10-19 RX ADMIN — HYDROMORPHONE HYDROCHLORIDE 1 MG: 1 INJECTION, SOLUTION INTRAMUSCULAR; INTRAVENOUS; SUBCUTANEOUS at 22:22

## 2022-10-19 RX ADMIN — OXYCODONE HYDROCHLORIDE 10 MG: 10 TABLET ORAL at 20:38

## 2022-10-19 RX ADMIN — METAXALONE 400 MG: 800 TABLET ORAL at 17:21

## 2022-10-19 RX ADMIN — POLYETHYLENE GLYCOL 3350 1 PACKET: 17 POWDER, FOR SOLUTION ORAL at 17:21

## 2022-10-19 RX ADMIN — DEXTROSE MONOHYDRATE: 25 INJECTION, SOLUTION INTRAVENOUS at 09:48

## 2022-10-19 ASSESSMENT — PAIN DESCRIPTION - PAIN TYPE
TYPE: ACUTE PAIN;SURGICAL PAIN
TYPE: ACUTE PAIN
TYPE: ACUTE PAIN;SURGICAL PAIN
TYPE: ACUTE PAIN
TYPE: ACUTE PAIN;SURGICAL PAIN
TYPE: ACUTE PAIN;SURGICAL PAIN
TYPE: ACUTE PAIN
TYPE: ACUTE PAIN;SURGICAL PAIN
TYPE: ACUTE PAIN
TYPE: ACUTE PAIN;SURGICAL PAIN
TYPE: ACUTE PAIN
TYPE: ACUTE PAIN

## 2022-10-19 ASSESSMENT — FIBROSIS 4 INDEX: FIB4 SCORE: 14.8

## 2022-10-19 ASSESSMENT — PAIN SCALES - GENERAL: PAIN_LEVEL: 0

## 2022-10-19 NOTE — PROGRESS NOTES
1430: Patient not waking up, no gag/cough/corneal. No withdrawal to pain. Updated Dr. Frazier. Dr. Frazier at bedside. Plan to give patient more time to wake up from anesthesia. Patients sister, Angelito, at bedside.

## 2022-10-19 NOTE — ANESTHESIA PREPROCEDURE EVALUATION
Case: [id number] Date/Time: 10/19/22 0715    Procedure: C5-T2 POSTERIOR FUSION WITH OARM (Back)    Anesthesia type: General    Pre-op diagnosis: Comminuted fracture at C7-T1     Location: [facility] OR 04 / SURGERY [facility]    Surgeons: Cyndi Tian M.D.          Relevant Problems   CARDIAC   (positive) Internal carotid artery stenosis, left         (positive) Cirrhosis (HCC)      Other   (positive) Bilateral pleural effusion   (positive) Closed fracture of seventh cervical vertebra (HCC)   (positive) Fracture of thoracic spine without spinal cord lesion, closed, initial encounter (HCC)   (positive) Other specified anemias   (positive) Pain, chronic   (positive) Tongue cancer (HCC)   (positive) Trauma   Cirrhosis, anemia transfuse Hbg <7, INR 1.5 2 days ago    Physical Exam    Airway   Mallampati: II  TM distance: >3 FB  Neck ROM: full       Cardiovascular - normal exam  Rhythm: regular  Rate: normal  (-) murmur     Dental - normal exam           Pulmonary - normal exam  Breath sounds clear to auscultation     Abdominal    Neurological              Anesthesia Plan    ASA 3- EMERGENT   ASA physical status emergent criteria: acute hemorrhage, displaced fracture with possible neurovascular compromise and neurologic compromise requiring immediate intervention    Plan - general               Induction: intravenous    Postoperative Plan: Postoperative administration of opioids is intended.    Pertinent diagnostic labs and testing reviewed    Informed Consent:    Anesthetic plan and risks discussed with patient.    Use of blood products discussed with: patient whom consented to blood products.

## 2022-10-19 NOTE — PROGRESS NOTES
Pt back to S115 from OR with anesthesia and OR RN.    4 Eyes Skin Assessment Completed by JELLY Stoll and JELLY Mathews.    Head WDL  Ears WDL  Nose WDL  Mouth WDL  Neck WDL  Breast/Chest WDL  Shoulder Blades WDL  Spine Incision  (R) Arm/Elbow/Hand WDL  (L) Arm/Elbow/Hand Abrasion, Swelling, and Weeping wounds from trauma on left upper arm  Abdomen Redness, Blanching, and Non-Blanching indentation from peg tube, pt was laying on during surgery  Groin WDL  Scrotum/Coccyx/Buttocks WDL  (R) Leg Redness and Blanching on stump  (L) Leg WDL  (R) Heel/Foot/Toe WDL  (L) Heel/Foot/Toe WDL          Devices In Places ECG, Blood Pressure Cuff, Pulse Ox, Boland, Arterial Line, SCD's, ET Tube, Cervical Collar, and G Tube      Interventions In Place Sacral Mepilex, TAP System, Pillows, Q2 Turns, and Pressure Redistribution Mattress    Possible Skin Injury yes    Pictures Uploaded Into Epic Yes  Wound Consult Placed Yes; if abdomen redness does not gerri upon reassessment  RN Wound Prevention Protocol Ordered Yes

## 2022-10-19 NOTE — ANESTHESIA PROCEDURE NOTES
Arterial Line  Performed by: Guilherme Soni M.D.  Authorized by: Guilherme Soni M.D.     Start Time:  10/19/2022 7:53 AM  End Time:  10/19/2022 7:55 AM  Localization: surface landmarks    Patient Location:  OR  Indication: continuous blood pressure monitoring        Catheter Size:  20 G  Seldinger Technique?: Yes    Laterality:  Right  Site:  Radial artery  Line Secured:  Antimicrobial disc, tape and transparent dressing  Events: patient tolerated procedure well with no complications

## 2022-10-19 NOTE — PROGRESS NOTES
Neurosurgery Consult Note    No neuro vents overnight.     Physical Exam:    The patient is resting comfortably in bed in no acute distress.    Neurological  Awake, alert, oriented x3. CN II-XII intact.  C spine precautions, [place] J in place    Motor  RUE  LUE  Deltoid      5/5     5/5  Biceps         5/5     5/5  Triceps         5/5     5/5  Wrist flexion     5/5     5/5  Wrist extension    5/5     5/5  Finger flexion     5/5     5/5  Interossei     5/5     5/5      RLE  LLE  Iliopsoas     5/5     0/5  Quadriceps     5/5     *BKA  Dorsiflexion                   5/5      Eversion                        5/5    EHL                               5/5       Plantarflexion                 5/5       Hamstrings                     5/5        Sensation  Sensation to light touch intact in all sensory dermatomes in four extremities.    Labs:  Recent Labs     10/18/22  0555 10/18/22  1428 10/19/22  0500   WBC 9.2 7.4 6.8   RBC 2.25* 2.43* 2.31*   HEMOGLOBIN 6.6* 7.1* 6.8*   HEMATOCRIT 20.4* 22.1* 21.0*   MCV 90.7 90.9 90.9   MCH 29.3 29.2 29.4   MCHC 32.4* 32.1* 32.4*   RDW 54.4* 50.8* 50.7*   PLATELETCT 75* 63* 54*   MPV 12.5 11.9 12.0       Recent Labs     10/17/22  1542 10/18/22  0555 10/19/22  0500   SODIUM 136 135 136   POTASSIUM 5.3 5.7* 4.9   CHLORIDE 107 107 108   CO2 21 20 18*   GLUCOSE 128* 139* 118*   BUN 37* 35* 30*   CREATININE 1.08 1.08 0.91   CALCIUM 7.7* 7.6* 7.5*       Recent Labs     10/17/22  1542   APTT 32.5   INR 1.50*       Recent Labs     10/17/22  1835 10/18/22  0555   REACTMIN 4.1* 6.1   CLOTKINET 0.9 1.5   CLOTANGL 77.5 70.1   MAXCLOTS 64.9 58.9   [id number] 0.1 1.0   PRCINADP 90.0* 43.3*   PRCINAA 50.8* 28.0*         Imaging:  CTA neck dated 10/17/2020 was reviewed in detail.  There is no vertebral artery occlusion or dissection.    Assessment and Plan:    Terence Mckeon Jr. is a 72 y.o. male presenting with an unstable 3 column cervical thoracic spine injury in the setting of DISH/ankylosing  spondylitis.  There is no evidence of spinal cord injury.  He has an associated mostly dorsal epidural hematoma that is displacing the spinal cord, but does not appear symptomatic from it. SHRUTI E.  He also has a minor T6-T7 compression fracture and minor L2 burst fracture.    Recommendations:  -Cervical thoracic fracture and ligamentous injury is spanning 3 columns, which could be unstable. Plan for operative fixation with C5-T2 posterior instrumentation, today. The indications, benefits, alternatives and risks to the procedure were discussed with the patient, which included but were not limited to bleeding, infection, stroke, injury to nearby nerves and vessels, cerebrospinal fluid leak, new temporary or permanent motor weakness, hardware failure, adjacent segment disease, urinary or bowel difficulties, coma and rarely, even death.  The patient was in agreement and wished to proceed.  -Continue strict cervical spine precautions with [place] J collar at all times.  -Thoracic fractures: No acute intervention  -L2 burst fracture: Recommend TLSO brace, must be worn when up and out of bed, otherwise okay to sit up in bed without height restriction  - DVT prophylaxis: Would hold DVT prophylaxis for at least 1 to 2 days due to presence of epidural hematoma and cervical spine    Thank you for this consult. Please call with questions.    Cyndi Tian M.D.  [facility]  [address] [place] [id number] [phone]    A total of 35 minutes were spent in the evaluation, examination, coordination of care, review of labs and imaging of this patient. I spent >50% of time face-to-face on patient counseling.

## 2022-10-19 NOTE — ANESTHESIA PROCEDURE NOTES
Peripheral IV    Date/Time: 10/19/2022 7:55 AM  Performed by: Guilherme Soin M.D.  Authorized by: Guilherme Soni M.D.     Size:  16 G  Laterality:  Right (Forearm)  Local Anesthetic:  None  Site Prep:  Alcohol  Technique:  Direct puncture  Attempts:  1
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
## 2022-10-19 NOTE — ANESTHESIA PROCEDURE NOTES
Airway    Date/Time: 10/19/2022 7:52 AM  Performed by: Guilherme Soni M.D.  Authorized by: Guilherme Soni M.D.     Location:  OR  Urgency:  Elective  Indications for Airway Management:  Anesthesia      Spontaneous Ventilation: absent    Sedation Level:  Deep  Preoxygenated: Yes    Patient Position:  Sniffing  Mask Difficulty Assessment:  0 - not attempted  Final Airway Type:  Endotracheal airway  Final Endotracheal Airway:  ETT  Cuffed: Yes    Technique Used for Successful ETT Placement:  Video laryngoscopy    Insertion Site:  Oral  Blade Type:  Glide  Laryngoscope Blade/Videolaryngoscope Blade Size:  3  ETT Size (mm):  7.5  Measured from:  Teeth  ETT to Teeth (cm):  22  Placement Verified by: auscultation and capnometry    Cormack-Lehane Classification:  Grade I - full view of glottis  Number of Attempts at Approach:  1   Neck neutral stable, C-collar in place during intubation

## 2022-10-19 NOTE — CARE PLAN
Problem: Pain - Standard  Goal: Alleviation of pain or a reduction in pain to the patient’s comfort goal  Outcome: Progressing     Problem: Skin Integrity  Goal: Skin integrity is maintained or improved  Outcome: Progressing   The patient is Watcher - Medium risk of patient condition declining or worsening    Shift Goals  Clinical Goals: stable neuro exam  Patient Goals: pain control, rest  Family Goals: BIJU    Progress made toward(s) clinical / shift goals:  see above    Patient is not progressing towards the following goals:

## 2022-10-19 NOTE — PROGRESS NOTES
Patient transported to Preop with ICU RN and transporter at 0613 on monitor. No events during transfer.

## 2022-10-19 NOTE — ANESTHESIA TIME REPORT
Anesthesia Start and Stop Event Times     Date Time Event    10/19/2022 0717 Ready for Procedure     0744 Anesthesia Start     1228 Anesthesia Stop        Responsible Staff  10/19/22    Name Role Begin End    Guilherme Soni M.D. Anesth 0744 1228        Overtime Reason:  no overtime (within assigned shift)    Comments:

## 2022-10-19 NOTE — ANESTHESIA POSTPROCEDURE EVALUATION
Patient: Terence Mckeon Jr.    Procedure Summary     Date: 10/19/22 Room / Location: [facility] 04 / SURGERY [facility]    Anesthesia Start: 0744 Anesthesia Stop: 1228    Procedure: C5-T2 POSTERIOR FUSION WITH OARM (Back) Diagnosis: (Comminuted fracture at C7-T1 )    Surgeons: Cyndi Tian M.D. Responsible Provider: Guilherme Soni M.D.    Anesthesia Type: general ASA Status: 3 - Emergent          Final Anesthesia Type: general  Last vitals  BP   Blood Pressure : 117/69    Temp   36.2 °C (97.2 °F)    Pulse   82   Resp   14    SpO2   97 %      Anesthesia Post Evaluation    Patient location during evaluation: ICU  Patient participation: complete - patient cannot participate  Level of consciousness: sleepy but conscious  Pain score: 0    Airway patency: patent  Anesthetic complications: no  Cardiovascular status: hemodynamically stable  Respiratory status: acceptable and ETT  Hydration status: euvolemic    PONV: none          No notable events documented.

## 2022-10-20 ENCOUNTER — APPOINTMENT (OUTPATIENT)
Dept: RADIOLOGY | Facility: MEDICAL CENTER | Age: 72
DRG: 028 | End: 2022-10-20
Attending: CLINICAL NURSE SPECIALIST
Payer: OTHER MISCELLANEOUS

## 2022-10-20 ENCOUNTER — APPOINTMENT (OUTPATIENT)
Dept: RADIOLOGY | Facility: MEDICAL CENTER | Age: 72
DRG: 028 | End: 2022-10-20
Attending: SURGERY
Payer: OTHER MISCELLANEOUS

## 2022-10-20 LAB
ALBUMIN SERPL BCP-MCNC: 2.5 G/DL (ref 3.2–4.9)
ALBUMIN/GLOB SERPL: 1.3 G/DL
ALP SERPL-CCNC: 62 U/L (ref 30–99)
ALT SERPL-CCNC: 14 U/L (ref 2–50)
ANION GAP SERPL CALC-SCNC: 8 MMOL/L (ref 7–16)
AST SERPL-CCNC: 36 U/L (ref 12–45)
BASE EXCESS BLDA CALC-SCNC: -6 MMOL/L (ref -4–3)
BASOPHILS # BLD AUTO: 0 % (ref 0–1.8)
BASOPHILS # BLD: 0 K/UL (ref 0–0.12)
BILIRUB SERPL-MCNC: 1.1 MG/DL (ref 0.1–1.5)
BODY TEMPERATURE: ABNORMAL DEGREES
BREATHS SETTING VENT: 22
BUN SERPL-MCNC: 36 MG/DL (ref 8–22)
CALCIUM SERPL-MCNC: 7.1 MG/DL (ref 8.5–10.5)
CHLORIDE SERPL-SCNC: 113 MMOL/L (ref 96–112)
CO2 BLDA-SCNC: 18 MMOL/L (ref 20–33)
CO2 SERPL-SCNC: 18 MMOL/L (ref 20–33)
CREAT SERPL-MCNC: 0.97 MG/DL (ref 0.5–1.4)
DELSYS IDSYS: ABNORMAL
END TIDAL CARBON DIOXIDE IECO2: 28 MMHG
EOSINOPHIL # BLD AUTO: 0 K/UL (ref 0–0.51)
EOSINOPHIL NFR BLD: 0 % (ref 0–6.9)
ERYTHROCYTE [DISTWIDTH] IN BLOOD BY AUTOMATED COUNT: 47.1 FL (ref 35.9–50)
GFR SERPLBLD CREATININE-BSD FMLA CKD-EPI: 83 ML/MIN/1.73 M 2
GLOBULIN SER CALC-MCNC: 2 G/DL (ref 1.9–3.5)
GLUCOSE SERPL-MCNC: 123 MG/DL (ref 65–99)
HCO3 BLDA-SCNC: 17.7 MMOL/L (ref 17–25)
HCT VFR BLD AUTO: 23.8 % (ref 42–52)
HGB BLD-MCNC: 8 G/DL (ref 14–18)
HOROWITZ INDEX BLDA+IHG-RTO: 297 MM[HG]
IMM GRANULOCYTES # BLD AUTO: 0.03 K/UL (ref 0–0.11)
IMM GRANULOCYTES NFR BLD AUTO: 0.6 % (ref 0–0.9)
LYMPHOCYTES # BLD AUTO: 0.21 K/UL (ref 1–4.8)
LYMPHOCYTES NFR BLD: 4.3 % (ref 22–41)
MAGNESIUM SERPL-MCNC: 1.9 MG/DL (ref 1.5–2.5)
MCH RBC QN AUTO: 29 PG (ref 27–33)
MCHC RBC AUTO-ENTMCNC: 33.6 G/DL (ref 33.7–35.3)
MCV RBC AUTO: 86.2 FL (ref 81.4–97.8)
MODE IMODE: ABNORMAL
MONOCYTES # BLD AUTO: 0.4 K/UL (ref 0–0.85)
MONOCYTES NFR BLD AUTO: 8.2 % (ref 0–13.4)
NEUTROPHILS # BLD AUTO: 4.21 K/UL (ref 1.82–7.42)
NEUTROPHILS NFR BLD: 86.9 % (ref 44–72)
NRBC # BLD AUTO: 0 K/UL
NRBC BLD-RTO: 0 /100 WBC
O2/TOTAL GAS SETTING VFR VENT: 30 %
PCO2 BLDA: 26.4 MMHG (ref 26–37)
PCO2 TEMP ADJ BLDA: 26.6 MMHG (ref 26–37)
PEEP END EXPIRATORY PRESSURE IPEEP: 8 CMH20
PH BLDA: 7.43 [PH] (ref 7.4–7.5)
PH TEMP ADJ BLDA: 7.43 [PH] (ref 7.4–7.5)
PHOSPHATE SERPL-MCNC: 2.2 MG/DL (ref 2.5–4.5)
PLATELET # BLD AUTO: 42 K/UL (ref 164–446)
PMV BLD AUTO: 11.8 FL (ref 9–12.9)
PO2 BLDA: 89 MMHG (ref 64–87)
PO2 TEMP ADJ BLDA: 89 MMHG (ref 64–87)
POTASSIUM SERPL-SCNC: 4.6 MMOL/L (ref 3.6–5.5)
PROT SERPL-MCNC: 4.5 G/DL (ref 6–8.2)
RBC # BLD AUTO: 2.76 M/UL (ref 4.7–6.1)
SAO2 % BLDA: 97 % (ref 93–99)
SODIUM SERPL-SCNC: 139 MMOL/L (ref 135–145)
SPECIMEN DRAWN FROM PATIENT: ABNORMAL
TIDAL VOLUME IVT: 410 ML
WBC # BLD AUTO: 4.9 K/UL (ref 4.8–10.8)

## 2022-10-20 PROCEDURE — 72125 CT NECK SPINE W/O DYE: CPT

## 2022-10-20 PROCEDURE — 85025 COMPLETE CBC W/AUTO DIFF WBC: CPT

## 2022-10-20 PROCEDURE — C9113 INJ PANTOPRAZOLE SODIUM, VIA: HCPCS | Performed by: SURGERY

## 2022-10-20 PROCEDURE — 37799 UNLISTED PX VASCULAR SURGERY: CPT

## 2022-10-20 PROCEDURE — 700111 HCHG RX REV CODE 636 W/ 250 OVERRIDE (IP): Performed by: SURGERY

## 2022-10-20 PROCEDURE — 99497 ADVNCD CARE PLAN 30 MIN: CPT | Performed by: NURSE PRACTITIONER

## 2022-10-20 PROCEDURE — 83735 ASSAY OF MAGNESIUM: CPT

## 2022-10-20 PROCEDURE — 700102 HCHG RX REV CODE 250 W/ 637 OVERRIDE(OP): Performed by: SURGERY

## 2022-10-20 PROCEDURE — 94799 UNLISTED PULMONARY SVC/PX: CPT

## 2022-10-20 PROCEDURE — 80053 COMPREHEN METABOLIC PANEL: CPT

## 2022-10-20 PROCEDURE — 84100 ASSAY OF PHOSPHORUS: CPT

## 2022-10-20 PROCEDURE — 71045 X-RAY EXAM CHEST 1 VIEW: CPT

## 2022-10-20 PROCEDURE — 94669 MECHANICAL CHEST WALL OSCILL: CPT

## 2022-10-20 PROCEDURE — 99291 CRITICAL CARE FIRST HOUR: CPT | Performed by: SURGERY

## 2022-10-20 PROCEDURE — 700111 HCHG RX REV CODE 636 W/ 250 OVERRIDE (IP): Performed by: CLINICAL NURSE SPECIALIST

## 2022-10-20 PROCEDURE — 94003 VENT MGMT INPAT SUBQ DAY: CPT

## 2022-10-20 PROCEDURE — 700102 HCHG RX REV CODE 250 W/ 637 OVERRIDE(OP): Performed by: CLINICAL NURSE SPECIALIST

## 2022-10-20 PROCEDURE — 82803 BLOOD GASES ANY COMBINATION: CPT

## 2022-10-20 PROCEDURE — 770022 HCHG ROOM/CARE - ICU (200)

## 2022-10-20 PROCEDURE — A9270 NON-COVERED ITEM OR SERVICE: HCPCS | Performed by: SURGERY

## 2022-10-20 PROCEDURE — 97167 OT EVAL HIGH COMPLEX 60 MIN: CPT

## 2022-10-20 PROCEDURE — 97535 SELF CARE MNGMENT TRAINING: CPT

## 2022-10-20 PROCEDURE — 99498 ADVNCD CARE PLAN ADDL 30 MIN: CPT | Performed by: NURSE PRACTITIONER

## 2022-10-20 PROCEDURE — 97162 PT EVAL MOD COMPLEX 30 MIN: CPT

## 2022-10-20 PROCEDURE — 700105 HCHG RX REV CODE 258: Performed by: SURGERY

## 2022-10-20 PROCEDURE — 700105 HCHG RX REV CODE 258: Performed by: CLINICAL NURSE SPECIALIST

## 2022-10-20 PROCEDURE — A9270 NON-COVERED ITEM OR SERVICE: HCPCS | Performed by: CLINICAL NURSE SPECIALIST

## 2022-10-20 RX ORDER — ENALAPRILAT 1.25 MG/ML
1.25 INJECTION INTRAVENOUS EVERY 6 HOURS PRN
Status: DISCONTINUED | OUTPATIENT
Start: 2022-10-20 | End: 2022-11-01 | Stop reason: HOSPADM

## 2022-10-20 RX ORDER — HYDRALAZINE HYDROCHLORIDE 20 MG/ML
10-20 INJECTION INTRAMUSCULAR; INTRAVENOUS EVERY 4 HOURS PRN
Status: DISCONTINUED | OUTPATIENT
Start: 2022-10-20 | End: 2022-10-22

## 2022-10-20 RX ORDER — FAMOTIDINE 20 MG/1
20 TABLET, FILM COATED ORAL 2 TIMES DAILY
Status: DISCONTINUED | OUTPATIENT
Start: 2022-10-20 | End: 2022-10-20

## 2022-10-20 RX ORDER — LABETALOL HYDROCHLORIDE 5 MG/ML
10-20 INJECTION, SOLUTION INTRAVENOUS EVERY 4 HOURS PRN
Status: DISCONTINUED | OUTPATIENT
Start: 2022-10-20 | End: 2022-10-22

## 2022-10-20 RX ORDER — AMLODIPINE BESYLATE 5 MG/1
10 TABLET ORAL
Status: DISCONTINUED | OUTPATIENT
Start: 2022-10-20 | End: 2022-11-01 | Stop reason: HOSPADM

## 2022-10-20 RX ADMIN — PANTOPRAZOLE SODIUM 40 MG: 40 INJECTION, POWDER, LYOPHILIZED, FOR SOLUTION INTRAVENOUS at 05:26

## 2022-10-20 RX ADMIN — METAXALONE 400 MG: 800 TABLET ORAL at 11:53

## 2022-10-20 RX ADMIN — GABAPENTIN 100 MG: 100 CAPSULE ORAL at 05:26

## 2022-10-20 RX ADMIN — HYDROMORPHONE HYDROCHLORIDE 1 MG: 1 INJECTION, SOLUTION INTRAMUSCULAR; INTRAVENOUS; SUBCUTANEOUS at 04:38

## 2022-10-20 RX ADMIN — METAXALONE 400 MG: 800 TABLET ORAL at 18:10

## 2022-10-20 RX ADMIN — SODIUM CHLORIDE 250 MG: 9 INJECTION, SOLUTION INTRAVENOUS at 05:49

## 2022-10-20 RX ADMIN — ACETAMINOPHEN 650 MG: 325 TABLET, FILM COATED ORAL at 23:43

## 2022-10-20 RX ADMIN — BACITRACIN ZINC: 500 OINTMENT TOPICAL at 11:54

## 2022-10-20 RX ADMIN — CEFAZOLIN 2 G: 2 INJECTION, POWDER, FOR SOLUTION INTRAMUSCULAR; INTRAVENOUS at 23:42

## 2022-10-20 RX ADMIN — OXYCODONE HYDROCHLORIDE 10 MG: 10 TABLET ORAL at 13:34

## 2022-10-20 RX ADMIN — METAXALONE 400 MG: 800 TABLET ORAL at 05:26

## 2022-10-20 RX ADMIN — HYDROMORPHONE HYDROCHLORIDE 1 MG: 1 INJECTION, SOLUTION INTRAMUSCULAR; INTRAVENOUS; SUBCUTANEOUS at 15:21

## 2022-10-20 RX ADMIN — GABAPENTIN 100 MG: 100 CAPSULE ORAL at 13:36

## 2022-10-20 RX ADMIN — CEFAZOLIN 2 G: 2 INJECTION, POWDER, FOR SOLUTION INTRAMUSCULAR; INTRAVENOUS at 16:23

## 2022-10-20 RX ADMIN — DOCUSATE SODIUM 100 MG: 50 LIQUID ORAL at 18:09

## 2022-10-20 RX ADMIN — POLYETHYLENE GLYCOL 3350 1 PACKET: 17 POWDER, FOR SOLUTION ORAL at 18:10

## 2022-10-20 RX ADMIN — CEFAZOLIN 2 G: 2 INJECTION, POWDER, FOR SOLUTION INTRAMUSCULAR; INTRAVENOUS at 00:59

## 2022-10-20 RX ADMIN — MAGNESIUM HYDROXIDE 30 ML: 400 SUSPENSION ORAL at 05:26

## 2022-10-20 RX ADMIN — HYDROMORPHONE HYDROCHLORIDE 1 MG: 1 INJECTION, SOLUTION INTRAMUSCULAR; INTRAVENOUS; SUBCUTANEOUS at 11:53

## 2022-10-20 RX ADMIN — CEFAZOLIN 2 G: 2 INJECTION, POWDER, FOR SOLUTION INTRAMUSCULAR; INTRAVENOUS at 08:07

## 2022-10-20 RX ADMIN — POLYETHYLENE GLYCOL 3350 1 PACKET: 17 POWDER, FOR SOLUTION ORAL at 05:26

## 2022-10-20 RX ADMIN — GABAPENTIN 100 MG: 100 CAPSULE ORAL at 21:36

## 2022-10-20 RX ADMIN — OXYCODONE HYDROCHLORIDE 10 MG: 10 TABLET ORAL at 18:10

## 2022-10-20 RX ADMIN — DOCUSATE SODIUM 100 MG: 50 LIQUID ORAL at 05:26

## 2022-10-20 RX ADMIN — HYDROMORPHONE HYDROCHLORIDE 1 MG: 1 INJECTION, SOLUTION INTRAMUSCULAR; INTRAVENOUS; SUBCUTANEOUS at 20:01

## 2022-10-20 RX ADMIN — OXYCODONE HYDROCHLORIDE 10 MG: 10 TABLET ORAL at 06:17

## 2022-10-20 RX ADMIN — OXYCODONE HYDROCHLORIDE 10 MG: 10 TABLET ORAL at 21:37

## 2022-10-20 RX ADMIN — AMLODIPINE BESYLATE 10 MG: 10 TABLET ORAL at 13:35

## 2022-10-20 RX ADMIN — OXYCODONE HYDROCHLORIDE 10 MG: 10 TABLET ORAL at 09:35

## 2022-10-20 RX ADMIN — HYDROMORPHONE HYDROCHLORIDE 1 MG: 1 INJECTION, SOLUTION INTRAMUSCULAR; INTRAVENOUS; SUBCUTANEOUS at 10:23

## 2022-10-20 ASSESSMENT — COGNITIVE AND FUNCTIONAL STATUS - GENERAL
MOVING TO AND FROM BED TO CHAIR: UNABLE
HELP NEEDED FOR BATHING: A LOT
TURNING FROM BACK TO SIDE WHILE IN FLAT BAD: UNABLE
TOILETING: A LOT
CLIMB 3 TO 5 STEPS WITH RAILING: TOTAL
STANDING UP FROM CHAIR USING ARMS: A LITTLE
DAILY ACTIVITIY SCORE: 15
SUGGESTED CMS G CODE MODIFIER DAILY ACTIVITY: CK
DRESSING REGULAR UPPER BODY CLOTHING: A LOT
MOVING FROM LYING ON BACK TO SITTING ON SIDE OF FLAT BED: UNABLE
WALKING IN HOSPITAL ROOM: A LITTLE
SUGGESTED CMS G CODE MODIFIER MOBILITY: CL
DRESSING REGULAR LOWER BODY CLOTHING: A LOT
PERSONAL GROOMING: A LITTLE
MOBILITY SCORE: 10

## 2022-10-20 ASSESSMENT — PAIN DESCRIPTION - PAIN TYPE
TYPE: ACUTE PAIN;SURGICAL PAIN
TYPE: ACUTE PAIN
TYPE: ACUTE PAIN;SURGICAL PAIN
TYPE: ACUTE PAIN
TYPE: ACUTE PAIN;SURGICAL PAIN
TYPE: ACUTE PAIN
TYPE: ACUTE PAIN;SURGICAL PAIN

## 2022-10-20 ASSESSMENT — GAIT ASSESSMENTS: GAIT LEVEL OF ASSIST: UNABLE TO PARTICIPATE

## 2022-10-20 ASSESSMENT — ACTIVITIES OF DAILY LIVING (ADL): TOILETING: INDEPENDENT

## 2022-10-20 ASSESSMENT — FIBROSIS 4 INDEX: FIB4 SCORE: 16.26

## 2022-10-20 NOTE — CARE PLAN
Problem: Knowledge Deficit - Standard  Goal: Patient and family/care givers will demonstrate understanding of plan of care, disease process/condition, diagnostic tests and medications  Outcome: Progressing     Problem: Pain - Standard  Goal: Alleviation of pain or a reduction in pain to the patient’s comfort goal  Outcome: Progressing     Problem: Skin Integrity  Goal: Skin integrity is maintained or improved  Outcome: Progressing     Problem: Fall Risk  Goal: Patient will remain free from falls  Outcome: Progressing     Problem: Safety - Medical Restraint  Goal: Remains free of injury from restraints (Restraint for Interference with Medical Device)  Outcome: Progressing  Goal: Free from restraint(s) (Restraint for Interference with Medical Device)  Outcome: Progressing   The patient is Watcher - Medium risk of patient condition declining or worsening    Shift Goals  Clinical Goals: BIJU  Patient Goals: BIJU  Family Goals: BIJU    Progress made toward(s) clinical / shift goals: progressing as anticipated     Patient is not progressing towards the following goals:

## 2022-10-20 NOTE — PROGRESS NOTES
1100: Pt's BP 150s-170 despite pain interventions. Updated Dr. Frazier. Plan to reassess in 2 hours. Dr. Frazier gave order to DC arterial line.
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
## 2022-10-20 NOTE — PROGRESS NOTES
Neurosurgery Consult Note    No neuro events overnight. On ventilator. Does indicate some neck pain.  Hgb 8.0    Physical Exam:    The patient is resting comfortably in bed in no acute distress, intubated. Follows commands.    Neurological  C spine precautions, [place] J in place    Motor  RUE  LUE  Deltoid      5/5     5/5  Biceps         5/5     5/5  Triceps         5/5     5/5  Wrist flexion     5/5     5/5  Wrist extension    5/5     5/5  Finger flexion     5/5     5/5  Interossei     5/5     5/5      RLE  LLE  Iliopsoas     5/5     0/5  Quadriceps     5/5     *BKA  Dorsiflexion                   5/5      Eversion                        5/5    EHL                               5/5       Plantarflexion                 5/5       Hamstrings                     5/5        Sensation  Sensation to light touch intact in all sensory dermatomes in four extremities.    HV in place 85cc    Labs:  Recent Labs     10/19/22  1240 10/19/22  2200 10/20/22  0420   WBC 6.3 4.3* 4.9   RBC 3.13* 2.76* 2.76*   HEMOGLOBIN 9.3* 8.2* 8.0*   HEMATOCRIT 28.2* 24.2* 23.8*   MCV 90.1 87.7 86.2   MCH 29.7 29.7 29.0   MCHC 33.0* 33.9 33.6*   RDW 45.8 47.6 47.1   PLATELETCT 49* 41* 42*   MPV 12.0 12.6 11.8       Recent Labs     10/19/22  0500 10/19/22  1240 10/20/22  0420   SODIUM 136 137 139   POTASSIUM 4.9 5.3 4.6   CHLORIDE 108 111 113*   CO2 18* 19* 18*   GLUCOSE 118* 94 123*   BUN 30* 27* 36*   CREATININE 0.91 0.96 0.97   CALCIUM 7.5* 7.8* 7.1*       Recent Labs     10/17/22  1542   APTT 32.5   INR 1.50*       Recent Labs     10/17/22  1835 10/18/22  0555 10/19/22  1240   REACTMIN 4.1* 6.1 5.3   CLOTKINET 0.9 1.5 1.8   CLOTANGL 77.5 70.1 70.8   MAXCLOTS 64.9 58.9 50.9*   IQO38UNY 0.1 1.0 0.0   PRCINADP 90.0* 43.3* 36.8*   PRCINAA 50.8* 28.0* 31.3*         Imaging:  CT C-spine 10/20/22 reviewed in detail. Acceptable hardware placement, no complications.    Assessment and Plan:    Terence Greg Mckeon Jr. is a 72 y.o. male presenting  with an unstable 3 column cervical thoracic spine injury in the setting of DISH/ankylosing spondylitis.  There is no evidence of spinal cord injury.  He has an associated mostly dorsal epidural hematoma that is displacing the spinal cord, but does not appear symptomatic from it. SHRUTI E.  He also has a minor T6-T7 compression fracture and minor L2 burst fracture.    Recommendations:  Cervicothoracic fracture:  - s/p C5-T2 posterior instrumentation, POD#1. Complicated by high intraoperative blood loss due to suspected coagulopathy. Remains intubated, neurologically intact and at baseline.   - Keep HV another day  - Ancef x24h  - CT C-spine completed.  -Continue strict cervical spine precautions with [place] collar at all times.    -Thoracic fractures: No acute intervention  -L2 burst fracture: Recommend TLSO brace, must be worn when up and out of bed, otherwise okay to sit up in bed without height restriction  - DVT prophylaxis: Hold DVT ppx until POD2    Thank you for this consult. Please call with questions.    Cyndi Tian M.D.  [facility]  [address] [place] [address]  [phone]

## 2022-10-20 NOTE — PROGRESS NOTES
Neurosurgery Consult Note    No neuro events overnight. On ventilator. Does indicate some neck pain.  Hgb 8.0    Physical Exam:    The patient is resting comfortably in bed in no acute distress, intubated. Follows commands.    Neurological  C spine precautions, [place] J in place    Motor  RUE  LUE  Deltoid      5/5     5/5  Biceps         5/5     5/5  Triceps         5/5     5/5  Wrist flexion     5/5     5/5  Wrist extension    5/5     5/5  Finger flexion     5/5     5/5  Interossei     5/5     5/5      RLE  LLE  Iliopsoas     5/5     0/5  Quadriceps     5/5     *BKA  Dorsiflexion                   5/5      Eversion                        5/5    EHL                               5/5       Plantarflexion                 5/5       Hamstrings                     5/5        Sensation  Sensation to light touch intact in all sensory dermatomes in four extremities.    HV in place 85cc    Labs:  Recent Labs     10/19/22  1240 10/19/22  2200 10/20/22  0420   WBC 6.3 4.3* 4.9   RBC 3.13* 2.76* 2.76*   HEMOGLOBIN 9.3* 8.2* 8.0*   HEMATOCRIT 28.2* 24.2* 23.8*   MCV 90.1 87.7 86.2   MCH 29.7 29.7 29.0   MCHC 33.0* 33.9 33.6*   RDW 45.8 47.6 47.1   PLATELETCT 49* 41* 42*   MPV 12.0 12.6 11.8       Recent Labs     10/19/22  0500 10/19/22  1240 10/20/22  0420   SODIUM 136 137 139   POTASSIUM 4.9 5.3 4.6   CHLORIDE 108 111 113*   CO2 18* 19* 18*   GLUCOSE 118* 94 123*   BUN 30* 27* 36*   CREATININE 0.91 0.96 0.97   CALCIUM 7.5* 7.8* 7.1*       Recent Labs     10/17/22  1542   APTT 32.5   INR 1.50*       Recent Labs     10/17/22  1835 10/18/22  0555 10/19/22  1240   REACTMIN 4.1* 6.1 5.3   CLOTKINET 0.9 1.5 1.8   CLOTANGL 77.5 70.1 70.8   MAXCLOTS 64.9 58.9 50.9*   [id number] 0.1 1.0 0.0   PRCINADP 90.0* 43.3* 36.8*   PRCINAA 50.8* 28.0* 31.3*         Imaging:  CT C-spine 10/20/22 reviewed in detail- acceptable placement of hardware no acute complication    Assessment and Plan:    Terence Mckeon Jr. is a 72 y.o. male  presenting with an unstable 3 column cervical thoracic spine injury in the setting of DISH/ankylosing spondylitis.  There is no evidence of spinal cord injury.  He has an associated mostly dorsal epidural hematoma that is displacing the spinal cord, but does not appear symptomatic from it. SHRUTI E.  He also has a minor T6-T7 compression fracture and minor L2 burst fracture.    Recommendations:  Cervicothoracic fracture:  - s/p C5-T2 posterior instrumentation, POD#1. Complicated by high intraoperative blood loss due to suspected coagulopathy. Remains intubated, neurologically intact and at baseline.   - Keep HV another day  - Ancef x24h  - CT C-spine completed  -Continue strict cervical spine precautions with [place] J collar at all times.    -Thoracic fractures: No acute intervention  -L2 burst fracture: Recommend TLSO brace, must be worn when up and out of bed, otherwise okay to sit up in bed without height restriction  - DVT prophylaxis: Hold DVT ppx until POD2    Thank you for this consult. Please call with questions.    Cyndi Tian M.D.  [facility]  [address] [place] [address]  [phone]

## 2022-10-20 NOTE — PROGRESS NOTES
Primary RN locked out of Epic account mid scan to administer medication. This RN and Macrela TRAVIS RN completed a 2 RN verify for Oxy 10mg was administered VIA PEG tube. NAM and charge RN notified.
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
## 2022-10-20 NOTE — CARE PLAN
Problem: Ventilation  Goal: Ability to achieve and maintain unassisted ventilation or tolerate decreased levels of ventilator support  Description: Target End Date:  4 days     Document on Vent flowsheet    1.  Support and monitor invasive and noninvasive mechanical ventilation  2.  Monitor ventilator weaning response  3.  Perform ventilator associated pneumonia prevention interventions  4.  Manage ventilation therapy by monitoring diagnostic test results  Outcome: Not Met      Ventilator Daily Summary    Vent Day #  1  Ventilator settings changed this shift:  no  Weaning trials:  yes  Respiratory Procedures:  no  Plan: Continue current ventilator settings and wean mechanical ventilation as tolerated per physician orders.   [phone]
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
## 2022-10-20 NOTE — CARE PLAN
Problem: Knowledge Deficit - Standard  Goal: Patient and family/care givers will demonstrate understanding of plan of care, disease process/condition, diagnostic tests and medications  Outcome: Progressing     Problem: Pain - Standard  Goal: Alleviation of pain or a reduction in pain to the patient’s comfort goal  Outcome: Progressing     Problem: Skin Integrity  Goal: Skin integrity is maintained or improved  Outcome: Progressing     Problem: Fall Risk  Goal: Patient will remain free from falls  Outcome: Progressing     Problem: Safety - Medical Restraint  Goal: Remains free of injury from restraints (Restraint for Interference with Medical Device)  Outcome: Met  Goal: Free from restraint(s) (Restraint for Interference with Medical Device)  Outcome: Met   The patient is Stable - Low risk of patient condition declining or worsening    Shift Goals  Clinical Goals: BIJU  Patient Goals: BIJU  Family Goals: BIJU    Progress made toward(s) clinical / shift goals:  extubated, off restraints, pain progressing to get better with management    Patient is not progressing towards the following goals:  None at this time
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
## 2022-10-21 ENCOUNTER — APPOINTMENT (OUTPATIENT)
Dept: RADIOLOGY | Facility: MEDICAL CENTER | Age: 72
DRG: 028 | End: 2022-10-21
Attending: SURGERY
Payer: OTHER MISCELLANEOUS

## 2022-10-21 ENCOUNTER — HOSPITAL ENCOUNTER (INPATIENT)
Facility: REHABILITATION | Age: 72
End: 2022-10-21
Attending: PHYSICAL MEDICINE & REHABILITATION | Admitting: PHYSICAL MEDICINE & REHABILITATION
Payer: MEDICARE

## 2022-10-21 LAB
ANION GAP SERPL CALC-SCNC: 8 MMOL/L (ref 7–16)
BASOPHILS # BLD AUTO: 0.2 % (ref 0–1.8)
BASOPHILS # BLD: 0.01 K/UL (ref 0–0.12)
BUN SERPL-MCNC: 34 MG/DL (ref 8–22)
CALCIUM SERPL-MCNC: 7.5 MG/DL (ref 8.5–10.5)
CHLORIDE SERPL-SCNC: 114 MMOL/L (ref 96–112)
CO2 SERPL-SCNC: 18 MMOL/L (ref 20–33)
CREAT SERPL-MCNC: 0.81 MG/DL (ref 0.5–1.4)
EOSINOPHIL # BLD AUTO: 0.02 K/UL (ref 0–0.51)
EOSINOPHIL NFR BLD: 0.3 % (ref 0–6.9)
ERYTHROCYTE [DISTWIDTH] IN BLOOD BY AUTOMATED COUNT: 50.1 FL (ref 35.9–50)
GFR SERPLBLD CREATININE-BSD FMLA CKD-EPI: 94 ML/MIN/1.73 M 2
GLUCOSE SERPL-MCNC: 99 MG/DL (ref 65–99)
HCT VFR BLD AUTO: 28 % (ref 42–52)
HGB BLD-MCNC: 9.2 G/DL (ref 14–18)
IMM GRANULOCYTES # BLD AUTO: 0.08 K/UL (ref 0–0.11)
IMM GRANULOCYTES NFR BLD AUTO: 1.3 % (ref 0–0.9)
LYMPHOCYTES # BLD AUTO: 0.42 K/UL (ref 1–4.8)
LYMPHOCYTES NFR BLD: 7 % (ref 22–41)
MCH RBC QN AUTO: 30 PG (ref 27–33)
MCHC RBC AUTO-ENTMCNC: 32.9 G/DL (ref 33.7–35.3)
MCV RBC AUTO: 91.2 FL (ref 81.4–97.8)
MONOCYTES # BLD AUTO: 0.64 K/UL (ref 0–0.85)
MONOCYTES NFR BLD AUTO: 10.7 % (ref 0–13.4)
NEUTROPHILS # BLD AUTO: 4.79 K/UL (ref 1.82–7.42)
NEUTROPHILS NFR BLD: 80.5 % (ref 44–72)
NRBC # BLD AUTO: 0 K/UL
NRBC BLD-RTO: 0 /100 WBC
PLATELET # BLD AUTO: 51 K/UL (ref 164–446)
PMV BLD AUTO: 11.9 FL (ref 9–12.9)
POTASSIUM SERPL-SCNC: 4.9 MMOL/L (ref 3.6–5.5)
RBC # BLD AUTO: 3.07 M/UL (ref 4.7–6.1)
SODIUM SERPL-SCNC: 140 MMOL/L (ref 135–145)
WBC # BLD AUTO: 6 K/UL (ref 4.8–10.8)

## 2022-10-21 PROCEDURE — 80048 BASIC METABOLIC PNL TOTAL CA: CPT

## 2022-10-21 PROCEDURE — 700102 HCHG RX REV CODE 250 W/ 637 OVERRIDE(OP): Performed by: SURGERY

## 2022-10-21 PROCEDURE — 85025 COMPLETE CBC W/AUTO DIFF WBC: CPT

## 2022-10-21 PROCEDURE — 99232 SBSQ HOSP IP/OBS MODERATE 35: CPT | Performed by: PHYSICIAN ASSISTANT

## 2022-10-21 PROCEDURE — 700111 HCHG RX REV CODE 636 W/ 250 OVERRIDE (IP): Performed by: CLINICAL NURSE SPECIALIST

## 2022-10-21 PROCEDURE — 94669 MECHANICAL CHEST WALL OSCILL: CPT

## 2022-10-21 PROCEDURE — 71045 X-RAY EXAM CHEST 1 VIEW: CPT

## 2022-10-21 PROCEDURE — 700105 HCHG RX REV CODE 258: Performed by: CLINICAL NURSE SPECIALIST

## 2022-10-21 PROCEDURE — A9270 NON-COVERED ITEM OR SERVICE: HCPCS | Performed by: SURGERY

## 2022-10-21 PROCEDURE — 700111 HCHG RX REV CODE 636 W/ 250 OVERRIDE (IP): Performed by: PHYSICIAN ASSISTANT

## 2022-10-21 PROCEDURE — 700111 HCHG RX REV CODE 636 W/ 250 OVERRIDE (IP): Performed by: SURGERY

## 2022-10-21 PROCEDURE — 770001 HCHG ROOM/CARE - MED/SURG/GYN PRIV*

## 2022-10-21 RX ORDER — ENOXAPARIN SODIUM 100 MG/ML
30 INJECTION SUBCUTANEOUS EVERY 12 HOURS
Status: DISCONTINUED | OUTPATIENT
Start: 2022-10-21 | End: 2022-11-01 | Stop reason: HOSPADM

## 2022-10-21 RX ADMIN — METAXALONE 400 MG: 800 TABLET ORAL at 11:11

## 2022-10-21 RX ADMIN — GABAPENTIN 100 MG: 100 CAPSULE ORAL at 06:10

## 2022-10-21 RX ADMIN — GABAPENTIN 100 MG: 100 CAPSULE ORAL at 13:06

## 2022-10-21 RX ADMIN — ENOXAPARIN SODIUM 30 MG: 30 INJECTION SUBCUTANEOUS at 17:33

## 2022-10-21 RX ADMIN — OXYCODONE HYDROCHLORIDE 10 MG: 10 TABLET ORAL at 19:36

## 2022-10-21 RX ADMIN — OXYCODONE HYDROCHLORIDE 10 MG: 10 TABLET ORAL at 03:36

## 2022-10-21 RX ADMIN — GABAPENTIN 100 MG: 100 CAPSULE ORAL at 21:24

## 2022-10-21 RX ADMIN — OMEPRAZOLE 40 MG: KIT at 08:25

## 2022-10-21 RX ADMIN — OXYCODONE HYDROCHLORIDE 10 MG: 10 TABLET ORAL at 16:13

## 2022-10-21 RX ADMIN — ACETAMINOPHEN 650 MG: 325 TABLET, FILM COATED ORAL at 06:10

## 2022-10-21 RX ADMIN — METAXALONE 400 MG: 800 TABLET ORAL at 06:10

## 2022-10-21 RX ADMIN — CEFAZOLIN 2 G: 2 INJECTION, POWDER, FOR SOLUTION INTRAMUSCULAR; INTRAVENOUS at 08:01

## 2022-10-21 RX ADMIN — AMLODIPINE BESYLATE 10 MG: 10 TABLET ORAL at 06:10

## 2022-10-21 RX ADMIN — BACITRACIN ZINC: 500 OINTMENT TOPICAL at 10:00

## 2022-10-21 RX ADMIN — METAXALONE 400 MG: 800 TABLET ORAL at 16:47

## 2022-10-21 RX ADMIN — ONDANSETRON HYDROCHLORIDE 4 MG: 2 SOLUTION INTRAMUSCULAR; INTRAVENOUS at 13:06

## 2022-10-21 RX ADMIN — DOCUSATE SODIUM 100 MG: 50 LIQUID ORAL at 16:47

## 2022-10-21 RX ADMIN — OXYCODONE HYDROCHLORIDE 10 MG: 10 TABLET ORAL at 06:17

## 2022-10-21 RX ADMIN — OXYCODONE HYDROCHLORIDE 10 MG: 10 TABLET ORAL at 13:12

## 2022-10-21 RX ADMIN — HYDRALAZINE HYDROCHLORIDE 20 MG: 20 INJECTION INTRAMUSCULAR; INTRAVENOUS at 13:12

## 2022-10-21 RX ADMIN — SENNOSIDES AND DOCUSATE SODIUM 1 TABLET: 50; 8.6 TABLET ORAL at 21:25

## 2022-10-21 RX ADMIN — OXYCODONE HYDROCHLORIDE 10 MG: 10 TABLET ORAL at 10:09

## 2022-10-21 ASSESSMENT — ENCOUNTER SYMPTOMS
CHILLS: 0
NECK PAIN: 1
COUGH: 0
ABDOMINAL PAIN: 0
FEVER: 0
BACK PAIN: 1
SENSORY CHANGE: 1
SHORTNESS OF BREATH: 0
VOMITING: 0
NAUSEA: 0
MYALGIAS: 1

## 2022-10-21 ASSESSMENT — PAIN DESCRIPTION - PAIN TYPE
TYPE: ACUTE PAIN;SURGICAL PAIN
TYPE: ACUTE PAIN
TYPE: ACUTE PAIN;SURGICAL PAIN
TYPE: ACUTE PAIN
TYPE: ACUTE PAIN
TYPE: ACUTE PAIN;SURGICAL PAIN
TYPE: ACUTE PAIN
TYPE: ACUTE PAIN

## 2022-10-21 ASSESSMENT — FIBROSIS 4 INDEX: FIB4 SCORE: 13.39
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
## 2022-10-21 NOTE — PROGRESS NOTES
[facility] 150-170 after giving PRN pain medication and scheduled amlodipine. Will continue to monitor.

## 2022-10-21 NOTE — CARE PLAN
Problem: Hyperinflation  Goal: Prevent or improve atelectasis  Description: Target End Date:  3 to 4 days    1. Instruct incentive spirometry usage  2.  Perform hyperinflation therapy as indicated  Outcome: Progressing   Pt appears to understand the importance of using the IS and pep therapy

## 2022-10-21 NOTE — CARE PLAN
The patient is Stable - Low risk of patient condition declining or worsening    Shift Goals  Clinical Goals: mobility  Patient Goals: comfort  Family Goals: natividad      Problem: Knowledge Deficit - Standard  Goal: Patient and family/care givers will demonstrate understanding of plan of care, disease process/condition, diagnostic tests and medications  Outcome: Progressing
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
## 2022-10-21 NOTE — THERAPY
Physical Therapy Contact Note    Attempted to see pt for PT session. Pt declined secondary to fatigue from multiple transfers to the commode during the day. Will follow up as able.    Zarina Ellison, PT, DPT

## 2022-10-21 NOTE — CARE PLAN
The patient is Stable - Low risk of patient condition declining or worsening    Shift Goals Bowel movement  Clinical Goals: BP control, pain control  Patient Goals: pain control, comfort  Family Goals: comfort and keep informed    Progress made toward(s) clinical / shift goals:  Patient had 1 bowel movement after protocol initiated by prior shift      Patient is not progressing towards the following goals:

## 2022-10-21 NOTE — DIETARY
Nutrition Services Brief Update:  Pt receiving Isosource 1.5 bolus feeds, goal of 6 containers per day. Pt tolerating full bolus feeds per RN.   Problem: Nutritional:  Goal: Nutrition support tolerated and meeting greater than 85% of estimated needs  Outcome: Goal Met.  RD following

## 2022-10-21 NOTE — PROGRESS NOTES
Neurosurgery Consult Note    No neuro events overnight. Extubated. No new radiating arm pain or weakness.  Hgb stable.    Physical Exam:    The patient is resting comfortably in bed in no acute distress.     Neurological  C spine precautions, [place] J in place    Motor  RUE  LUE  Deltoid      5/5     5/5  Biceps         5/5     5/5  Triceps         5/5     5/5  Wrist flexion     5/5     5/5  Wrist extension    5/5     5/5  Finger flexion     5/5     5/5  Interossei     5/5     5/5      RLE  LLE  Iliopsoas     5/5     0/5  Quadriceps     5/5     *BKA  Dorsiflexion                   5/5      Eversion                        5/5    EHL                               5/5       Plantarflexion                 5/5       Hamstrings                     5/5        Sensation  Sensation to light touch intact in all sensory dermatomes in four extremities.    HV in place 80cc  Incision closed with nylon, c/d/i    Labs:  Recent Labs     10/19/22  2200 10/20/22  0420 10/21/22  0352   WBC 4.3* 4.9 6.0   RBC 2.76* 2.76* 3.07*   HEMOGLOBIN 8.2* 8.0* 9.2*   HEMATOCRIT 24.2* 23.8* 28.0*   MCV 87.7 86.2 91.2   MCH 29.7 29.0 30.0   MCHC 33.9 33.6* 32.9*   RDW 47.6 47.1 50.1*   PLATELETCT 41* 42* 51*   MPV 12.6 11.8 11.9       Recent Labs     10/19/22  1240 10/20/22  0420 10/21/22  0352   SODIUM 137 139 140   POTASSIUM 5.3 4.6 4.9   CHLORIDE 111 113* 114*   CO2 19* 18* 18*   GLUCOSE 94 123* 99   BUN 27* 36* 34*   CREATININE 0.96 0.97 0.81   CALCIUM 7.8* 7.1* 7.5*             Recent Labs     10/19/22  1240   REACTMIN 5.3   CLOTKINET 1.8   CLOTANGL 70.8   MAXCLOTS 50.9*   [id number] 0.0   PRCINADP 36.8*   PRCINAA 31.3*         Assessment and Plan:    Terence cMkeon Jr. is a 72 y.o. male presenting with an unstable 3 column cervical thoracic spine injury in the setting of DISH/ankylosing spondylitis.  There is no evidence of spinal cord injury.  He has an associated mostly dorsal epidural hematoma that is displacing the spinal cord, but  does not appear symptomatic from it. SHRUTI E.  He also has a minor T6-T7 compression fracture and minor L2 burst fracture.    Recommendations:  Cervicothoracic fracture:  - s/p C5-T2 posterior instrumentation, POD#2. Complicated by high intraoperative blood loss due to suspected coagulopathy, now Hgb stable. Extubated. Neurologically intact and at baseline.   - Remove HV today  - CT C-spine completed  -Continue strict cervical spine precautions with [place] J collar at all times for 6 weeks  - The incision is closed with nylon suture. Wash incision with soap and water at least every other day. Pat to dry. Leave open to air. No creams, gels, ointments. Call the clinic if any drainage or bleeding occurs.  - follow up in 2 weeks for suture removal, 6 weeks with Xrays. Clinic will coordinate follow-up.      -Thoracic fractures: No acute intervention  -L2 burst fracture: Recommend TLSO brace, must be worn when up and out of bed, otherwise okay to sit up in bed without height restriction  - DVT prophylaxis: OK to start if clinically appropriate    Thank you for this consult. Please call with questions.    Cyndi Tian M.D.  [facility]  [id number] [id number]  [phone]

## 2022-10-22 PROBLEM — J96.01 ACUTE RESPIRATORY FAILURE WITH HYPOXIA (HCC): Status: RESOLVED | Noted: 2022-10-19 | Resolved: 2022-10-22

## 2022-10-22 PROBLEM — Z75.8 DISCHARGE PLANNING ISSUES: Status: ACTIVE | Noted: 2022-10-22

## 2022-10-22 PROBLEM — M25.521 RIGHT ELBOW PAIN: Status: RESOLVED | Noted: 2022-10-18 | Resolved: 2022-10-22

## 2022-10-22 PROBLEM — Z02.9 DISCHARGE PLANNING ISSUES: Status: ACTIVE | Noted: 2022-10-22

## 2022-10-22 PROBLEM — J90 BILATERAL PLEURAL EFFUSION: Status: RESOLVED | Noted: 2022-10-18 | Resolved: 2022-10-22

## 2022-10-22 PROBLEM — M25.572 ACUTE LEFT ANKLE PAIN: Status: RESOLVED | Noted: 2022-10-18 | Resolved: 2022-10-22

## 2022-10-22 LAB
ANION GAP SERPL CALC-SCNC: 7 MMOL/L (ref 7–16)
BASOPHILS # BLD AUTO: 0.1 % (ref 0–1.8)
BASOPHILS # BLD: 0.01 K/UL (ref 0–0.12)
BUN SERPL-MCNC: 32 MG/DL (ref 8–22)
CALCIUM SERPL-MCNC: 7.6 MG/DL (ref 8.5–10.5)
CHLORIDE SERPL-SCNC: 115 MMOL/L (ref 96–112)
CO2 SERPL-SCNC: 20 MMOL/L (ref 20–33)
CREAT SERPL-MCNC: 0.66 MG/DL (ref 0.5–1.4)
EOSINOPHIL # BLD AUTO: 0.02 K/UL (ref 0–0.51)
EOSINOPHIL NFR BLD: 0.3 % (ref 0–6.9)
ERYTHROCYTE [DISTWIDTH] IN BLOOD BY AUTOMATED COUNT: 51.5 FL (ref 35.9–50)
GFR SERPLBLD CREATININE-BSD FMLA CKD-EPI: 100 ML/MIN/1.73 M 2
GLUCOSE SERPL-MCNC: 109 MG/DL (ref 65–99)
HCT VFR BLD AUTO: 29.4 % (ref 42–52)
HGB BLD-MCNC: 9.3 G/DL (ref 14–18)
IMM GRANULOCYTES # BLD AUTO: 0.08 K/UL (ref 0–0.11)
IMM GRANULOCYTES NFR BLD AUTO: 1.2 % (ref 0–0.9)
LYMPHOCYTES # BLD AUTO: 0.28 K/UL (ref 1–4.8)
LYMPHOCYTES NFR BLD: 4.2 % (ref 22–41)
MCH RBC QN AUTO: 29.3 PG (ref 27–33)
MCHC RBC AUTO-ENTMCNC: 31.6 G/DL (ref 33.7–35.3)
MCV RBC AUTO: 92.7 FL (ref 81.4–97.8)
MONOCYTES # BLD AUTO: 0.62 K/UL (ref 0–0.85)
MONOCYTES NFR BLD AUTO: 9.3 % (ref 0–13.4)
NEUTROPHILS # BLD AUTO: 5.68 K/UL (ref 1.82–7.42)
NEUTROPHILS NFR BLD: 84.9 % (ref 44–72)
NRBC # BLD AUTO: 0 K/UL
NRBC BLD-RTO: 0 /100 WBC
PLATELET # BLD AUTO: 61 K/UL (ref 164–446)
PMV BLD AUTO: 12.4 FL (ref 9–12.9)
POTASSIUM SERPL-SCNC: 4.8 MMOL/L (ref 3.6–5.5)
RBC # BLD AUTO: 3.17 M/UL (ref 4.7–6.1)
SODIUM SERPL-SCNC: 142 MMOL/L (ref 135–145)
WBC # BLD AUTO: 6.7 K/UL (ref 4.8–10.8)

## 2022-10-22 PROCEDURE — 700102 HCHG RX REV CODE 250 W/ 637 OVERRIDE(OP): Performed by: SURGERY

## 2022-10-22 PROCEDURE — 99232 SBSQ HOSP IP/OBS MODERATE 35: CPT | Performed by: NURSE PRACTITIONER

## 2022-10-22 PROCEDURE — 85025 COMPLETE CBC W/AUTO DIFF WBC: CPT

## 2022-10-22 PROCEDURE — 36415 COLL VENOUS BLD VENIPUNCTURE: CPT

## 2022-10-22 PROCEDURE — 700111 HCHG RX REV CODE 636 W/ 250 OVERRIDE (IP): Performed by: PHYSICIAN ASSISTANT

## 2022-10-22 PROCEDURE — A9270 NON-COVERED ITEM OR SERVICE: HCPCS | Performed by: SURGERY

## 2022-10-22 PROCEDURE — 94669 MECHANICAL CHEST WALL OSCILL: CPT

## 2022-10-22 PROCEDURE — 770001 HCHG ROOM/CARE - MED/SURG/GYN PRIV*

## 2022-10-22 PROCEDURE — 80048 BASIC METABOLIC PNL TOTAL CA: CPT

## 2022-10-22 RX ORDER — LABETALOL HYDROCHLORIDE 5 MG/ML
10 INJECTION, SOLUTION INTRAVENOUS EVERY 4 HOURS PRN
Status: DISCONTINUED | OUTPATIENT
Start: 2022-10-22 | End: 2022-11-01 | Stop reason: HOSPADM

## 2022-10-22 RX ORDER — HYDRALAZINE HYDROCHLORIDE 20 MG/ML
10 INJECTION INTRAMUSCULAR; INTRAVENOUS EVERY 4 HOURS PRN
Status: DISCONTINUED | OUTPATIENT
Start: 2022-10-22 | End: 2022-11-01 | Stop reason: HOSPADM

## 2022-10-22 RX ADMIN — METAXALONE 400 MG: 800 TABLET ORAL at 05:26

## 2022-10-22 RX ADMIN — OXYCODONE HYDROCHLORIDE 10 MG: 10 TABLET ORAL at 00:03

## 2022-10-22 RX ADMIN — ENOXAPARIN SODIUM 30 MG: 30 INJECTION SUBCUTANEOUS at 05:27

## 2022-10-22 RX ADMIN — AMLODIPINE BESYLATE 10 MG: 10 TABLET ORAL at 05:26

## 2022-10-22 RX ADMIN — DOCUSATE SODIUM 100 MG: 50 LIQUID ORAL at 05:26

## 2022-10-22 RX ADMIN — OXYCODONE HYDROCHLORIDE 10 MG: 10 TABLET ORAL at 09:52

## 2022-10-22 RX ADMIN — ACETAMINOPHEN 650 MG: 325 TABLET, FILM COATED ORAL at 12:02

## 2022-10-22 RX ADMIN — OXYCODONE HYDROCHLORIDE 10 MG: 10 TABLET ORAL at 14:22

## 2022-10-22 RX ADMIN — METAXALONE 400 MG: 800 TABLET ORAL at 18:33

## 2022-10-22 RX ADMIN — OXYCODONE HYDROCHLORIDE 10 MG: 10 TABLET ORAL at 05:26

## 2022-10-22 RX ADMIN — OXYCODONE HYDROCHLORIDE 10 MG: 10 TABLET ORAL at 22:19

## 2022-10-22 RX ADMIN — DOCUSATE SODIUM 100 MG: 50 LIQUID ORAL at 18:26

## 2022-10-22 RX ADMIN — POLYETHYLENE GLYCOL 3350 1 PACKET: 17 POWDER, FOR SOLUTION ORAL at 05:26

## 2022-10-22 RX ADMIN — GABAPENTIN 100 MG: 100 CAPSULE ORAL at 05:26

## 2022-10-22 RX ADMIN — OXYCODONE HYDROCHLORIDE 10 MG: 10 TABLET ORAL at 18:26

## 2022-10-22 RX ADMIN — OMEPRAZOLE 40 MG: KIT at 05:25

## 2022-10-22 RX ADMIN — GABAPENTIN 100 MG: 100 CAPSULE ORAL at 22:19

## 2022-10-22 RX ADMIN — METAXALONE 400 MG: 800 TABLET ORAL at 12:02

## 2022-10-22 RX ADMIN — SENNOSIDES AND DOCUSATE SODIUM 1 TABLET: 50; 8.6 TABLET ORAL at 22:19

## 2022-10-22 RX ADMIN — BACITRACIN ZINC: 500 OINTMENT TOPICAL at 09:52

## 2022-10-22 RX ADMIN — ENOXAPARIN SODIUM 30 MG: 30 INJECTION SUBCUTANEOUS at 18:26

## 2022-10-22 RX ADMIN — GABAPENTIN 100 MG: 100 CAPSULE ORAL at 14:23

## 2022-10-22 ASSESSMENT — ENCOUNTER SYMPTOMS
MYALGIAS: 1
NAUSEA: 0
SHORTNESS OF BREATH: 0
EYES NEGATIVE: 1
CARDIOVASCULAR NEGATIVE: 1
SENSORY CHANGE: 1
CHILLS: 0
VOMITING: 0
NECK PAIN: 1
BACK PAIN: 1
COUGH: 0
FEVER: 0
ABDOMINAL PAIN: 0

## 2022-10-22 ASSESSMENT — PAIN DESCRIPTION - PAIN TYPE
TYPE: ACUTE PAIN
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
## 2022-10-22 NOTE — PROGRESS NOTES
4 Eyes Skin Assessment Completed by JELLY Coppola and JELLY Reynolds.    Head WDL  Ears WDL  Nose WDL  Mouth WDL  Neck Scab and Incision  Breast/Chest WDL  Shoulder Blades Redness, Scabbing, Abrasions  Spine Redness and Incision  (R) Arm/Elbow/Hand Bruising, Abrasion, and Scar  (L) Arm/Elbow/Hand Redness, Abrasion, and Scar, drainage   Abdomen Redness, PEG tube  Groin WDL  Scrotum/Coccyx/Buttocks Redness  (R) Leg Scab  (L) Leg Redness, Scab, and Abrasion  (R) Heel/Foot/Toe BKA  (L) Heel/Foot/Toe Scab          Devices In Places Pulse Ox, Condom Cath, Cervical Collar, and G Tube      Interventions In Place Gray Ear Foams, Pillows, Q2 Turns, and Pressure Redistribution Mattress    Possible Skin Injury No    Pictures Uploaded Into Epic N/A  Wound Consult Placed N/A  RN Wound Prevention Protocol Ordered Yes

## 2022-10-22 NOTE — ASSESSMENT & PLAN NOTE
Date of admission: 10/17/2022.  10/21/22 Transfer orders from SICU.  10/21/22 Rehab referral.  10/25 Patient prefers Skilled Nursing Facility in [place], [place].  Physiatry no longer following.  10/27 [facility] has accepted pt, submitted for insurance authorization.  10/29 Insurance auth remains pending.  Cleared for discharge: Yes - Date: 10/22/22.  Discharge delayed: Yes - Reason: Financial.  Discharge date: 11/1/2022.

## 2022-10-22 NOTE — CARE PLAN
The patient is Stable - Low risk of patient condition declining or worsening    Shift Goals  Clinical Goals: Maintain skin integrity, dressing changes  Patient Goals: Rest, pain management  Family Goals: BIJU    Progress made toward(s) clinical / shift goals:  Patient tolerated dressing changes to left arm well. Medicated per MAR for pain. Patient pleasant and oriented x 4. Fall, aspirations in place, hourly rounding continues.     Problem: Knowledge Deficit - Standard  Goal: Patient and family/care givers will demonstrate understanding of plan of care, disease process/condition, diagnostic tests and medications  Outcome: Progressing     Problem: Pain - Standard  Goal: Alleviation of pain or a reduction in pain to the patient’s comfort goal  Outcome: Progressing     Problem: Skin Integrity  Goal: Skin integrity is maintained or improved  Outcome: Progressing     Problem: Fall Risk  Goal: Patient will remain free from falls  Outcome: Progressing       Patient is not progressing towards the following goals:
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
## 2022-10-22 NOTE — CARE PLAN
The patient is Stable - Low risk of patient condition declining or worsening    Shift Goals  Clinical Goals: Maintain skin integrity;  Patient Goals: Pain control and sleep;  Family Goals: BIJU;      Problem: Pain - Standard  Goal: Alleviation of pain or a reduction in pain to the patient’s comfort goal  Outcome: Progressing  Note: Patient reporting generalized pain. Pain assessed during hourly rounding. PRN pain medication administered per MAR.      Problem: Skin Integrity  Goal: Skin integrity is maintained or improved  Outcome: Progressing  Note: Patient educated on importance of frequent repositioning in prevention of skin breakdown. Patient verbalizes understanding. Assisting patient with repositioned q2 hours.

## 2022-10-23 PROCEDURE — 700102 HCHG RX REV CODE 250 W/ 637 OVERRIDE(OP): Performed by: SURGERY

## 2022-10-23 PROCEDURE — 99223 1ST HOSP IP/OBS HIGH 75: CPT | Performed by: PHYSICAL MEDICINE & REHABILITATION

## 2022-10-23 PROCEDURE — A9270 NON-COVERED ITEM OR SERVICE: HCPCS | Performed by: SURGERY

## 2022-10-23 PROCEDURE — 770001 HCHG ROOM/CARE - MED/SURG/GYN PRIV*

## 2022-10-23 PROCEDURE — 97602 WOUND(S) CARE NON-SELECTIVE: CPT

## 2022-10-23 PROCEDURE — 700111 HCHG RX REV CODE 636 W/ 250 OVERRIDE (IP): Performed by: PHYSICIAN ASSISTANT

## 2022-10-23 PROCEDURE — 700111 HCHG RX REV CODE 636 W/ 250 OVERRIDE (IP): Performed by: NURSE PRACTITIONER

## 2022-10-23 PROCEDURE — 99232 SBSQ HOSP IP/OBS MODERATE 35: CPT | Performed by: NURSE PRACTITIONER

## 2022-10-23 RX ORDER — HYDROMORPHONE HYDROCHLORIDE 1 MG/ML
0.5 INJECTION, SOLUTION INTRAMUSCULAR; INTRAVENOUS; SUBCUTANEOUS
Status: DISCONTINUED | OUTPATIENT
Start: 2022-10-23 | End: 2022-10-26

## 2022-10-23 RX ORDER — BACITRACIN ZINC 500 [USP'U]/G
OINTMENT TOPICAL DAILY
Status: DISCONTINUED | OUTPATIENT
Start: 2022-10-24 | End: 2022-11-01 | Stop reason: HOSPADM

## 2022-10-23 RX ADMIN — DOCUSATE SODIUM 100 MG: 50 LIQUID ORAL at 17:40

## 2022-10-23 RX ADMIN — OMEPRAZOLE 40 MG: KIT at 06:17

## 2022-10-23 RX ADMIN — METAXALONE 400 MG: 800 TABLET ORAL at 11:57

## 2022-10-23 RX ADMIN — GABAPENTIN 100 MG: 100 CAPSULE ORAL at 21:17

## 2022-10-23 RX ADMIN — POLYETHYLENE GLYCOL 3350 1 PACKET: 17 POWDER, FOR SOLUTION ORAL at 17:40

## 2022-10-23 RX ADMIN — ENOXAPARIN SODIUM 30 MG: 30 INJECTION SUBCUTANEOUS at 17:41

## 2022-10-23 RX ADMIN — OXYCODONE HYDROCHLORIDE 10 MG: 10 TABLET ORAL at 02:20

## 2022-10-23 RX ADMIN — SENNOSIDES AND DOCUSATE SODIUM 1 TABLET: 50; 8.6 TABLET ORAL at 21:17

## 2022-10-23 RX ADMIN — POLYETHYLENE GLYCOL 3350 1 PACKET: 17 POWDER, FOR SOLUTION ORAL at 06:19

## 2022-10-23 RX ADMIN — OXYCODONE HYDROCHLORIDE 10 MG: 10 TABLET ORAL at 09:52

## 2022-10-23 RX ADMIN — METAXALONE 400 MG: 800 TABLET ORAL at 06:16

## 2022-10-23 RX ADMIN — DOCUSATE SODIUM 100 MG: 50 LIQUID ORAL at 06:18

## 2022-10-23 RX ADMIN — AMLODIPINE BESYLATE 10 MG: 10 TABLET ORAL at 06:17

## 2022-10-23 RX ADMIN — OXYCODONE HYDROCHLORIDE 10 MG: 10 TABLET ORAL at 13:05

## 2022-10-23 RX ADMIN — BACITRACIN ZINC: 500 OINTMENT TOPICAL at 14:21

## 2022-10-23 RX ADMIN — OXYCODONE HYDROCHLORIDE 10 MG: 10 TABLET ORAL at 17:40

## 2022-10-23 RX ADMIN — METAXALONE 400 MG: 800 TABLET ORAL at 17:40

## 2022-10-23 RX ADMIN — GABAPENTIN 100 MG: 100 CAPSULE ORAL at 13:06

## 2022-10-23 RX ADMIN — GABAPENTIN 100 MG: 100 CAPSULE ORAL at 06:19

## 2022-10-23 RX ADMIN — OXYCODONE HYDROCHLORIDE 10 MG: 10 TABLET ORAL at 06:36

## 2022-10-23 RX ADMIN — HYDROMORPHONE HYDROCHLORIDE 0.5 MG: 1 INJECTION, SOLUTION INTRAMUSCULAR; INTRAVENOUS; SUBCUTANEOUS at 21:16

## 2022-10-23 RX ADMIN — ENOXAPARIN SODIUM 30 MG: 30 INJECTION SUBCUTANEOUS at 06:18

## 2022-10-23 RX ADMIN — HYDROMORPHONE HYDROCHLORIDE 0.5 MG: 1 INJECTION, SOLUTION INTRAMUSCULAR; INTRAVENOUS; SUBCUTANEOUS at 17:59

## 2022-10-23 ASSESSMENT — ENCOUNTER SYMPTOMS
CARDIOVASCULAR NEGATIVE: 1
NECK PAIN: 1
VOMITING: 0
FEVER: 0
BACK PAIN: 1
NAUSEA: 0
MYALGIAS: 1
SHORTNESS OF BREATH: 0
EYES NEGATIVE: 1
CHILLS: 0
SENSORY CHANGE: 1
ABDOMINAL PAIN: 0
COUGH: 0

## 2022-10-23 ASSESSMENT — PAIN DESCRIPTION - PAIN TYPE
TYPE: ACUTE PAIN

## 2022-10-23 NOTE — CARE PLAN
The patient is Stable - Low risk of patient condition declining or worsening    Shift Goals  Clinical Goals: No complications related to neuro status; Pain management; Skin integrity will be maintained  Patient Goals: Pain control; sleep; Bed bath  Family Goals: BIJU    Progress made toward(s) clinical / shift goals:  Patient remains alert and oriented with no new complications in neurological status observed. Pain management continued, as ordered. Patient turned and repositioned, as indicated and as needed. No distress has been observed. Will continue to monitor.      Problem: Knowledge Deficit - Standard  Goal: Patient and family/care givers will demonstrate understanding of plan of care, disease process/condition, diagnostic tests and medications  10/23/2022 0402 by Tomi Aguiar R.N.  Outcome: Progressing  10/23/2022 0359 by Tomi Aguiar R.N.  Outcome: Progressing     Problem: Pain - Standard  Goal: Alleviation of pain or a reduction in pain to the patient’s comfort goal  10/23/2022 0402 by Tomi Aguiar R.N.  Outcome: Progressing  10/23/2022 0359 by Tomi Aguiar R.N.  Outcome: Progressing     Problem: Skin Integrity  Goal: Skin integrity is maintained or improved  10/23/2022 0402 by RICHARD OliveiraN.  Outcome: Progressing  10/23/2022 0359 by Tomi Aguiar R.N.  Outcome: Progressing     Problem: Fall Risk  Goal: Patient will remain free from falls  10/23/2022 0402 by Tomi Aguiar R.N.  Outcome: Progressing  10/23/2022 0359 by Tomi Aguiar R.N.  Outcome: Progressing     Problem: Neuro Status  Goal: Neuro status will remain stable or improve  Outcome: Progressing

## 2022-10-23 NOTE — PROGRESS NOTES
Patient is in a lot of pain and still feels full from breakfast feed. Patient is belly breathing fast ,02 saturation is 96% on nc4L. Patient states that when he is in pain he breaths like this and feels SOB. Patient readjusted in bed. Will administer pain meds when due.
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
## 2022-10-23 NOTE — CARE PLAN
The patient is Stable - Low risk of patient condition declining or worsening    Shift Goals  Clinical Goals: neuro stable, pain under control  Patient Goals: sleep well, pain control  Family Goals: n/a    Progress made toward(s) clinical / shift goals:  Patient is aox4. Patient understands that he needs to use the call bell for anything he may need. Patient voices his concerns to me. Patient is being turned q2hr and skin remains intact. Pillows are used for support on extremities.     Patient is not progressing towards the following goals:Patient is in a lot of pain constantly even when pain meds are given. Patient cannot move that well in bed due to pain. Patient has c-collar in place at all times. And TLSO brace if he gets up. Patient states he has no need or interest in getting up. Patient has poor appetite for his G tube feeding.       Problem: Pain - Standard  Goal: Alleviation of pain or a reduction in pain to the patient’s comfort goal  Outcome: Not Met
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
## 2022-10-24 LAB
ANION GAP SERPL CALC-SCNC: 6 MMOL/L (ref 7–16)
BUN SERPL-MCNC: 34 MG/DL (ref 8–22)
CALCIUM SERPL-MCNC: 7.7 MG/DL (ref 8.5–10.5)
CHLORIDE SERPL-SCNC: 112 MMOL/L (ref 96–112)
CO2 SERPL-SCNC: 24 MMOL/L (ref 20–33)
CREAT SERPL-MCNC: 0.63 MG/DL (ref 0.5–1.4)
CRP SERPL HS-MCNC: 4.85 MG/DL (ref 0–0.75)
ERYTHROCYTE [DISTWIDTH] IN BLOOD BY AUTOMATED COUNT: 50.4 FL (ref 35.9–50)
GFR SERPLBLD CREATININE-BSD FMLA CKD-EPI: 101 ML/MIN/1.73 M 2
GLUCOSE SERPL-MCNC: 105 MG/DL (ref 65–99)
HCT VFR BLD AUTO: 27.6 % (ref 42–52)
HGB BLD-MCNC: 8.9 G/DL (ref 14–18)
MCH RBC QN AUTO: 30.5 PG (ref 27–33)
MCHC RBC AUTO-ENTMCNC: 32.2 G/DL (ref 33.7–35.3)
MCV RBC AUTO: 94.5 FL (ref 81.4–97.8)
PLATELET # BLD AUTO: 50 K/UL (ref 164–446)
PMV BLD AUTO: 12.1 FL (ref 9–12.9)
POTASSIUM SERPL-SCNC: 5 MMOL/L (ref 3.6–5.5)
PREALB SERPL-MCNC: 9.5 MG/DL (ref 18–38)
RBC # BLD AUTO: 2.92 M/UL (ref 4.7–6.1)
SODIUM SERPL-SCNC: 142 MMOL/L (ref 135–145)
WBC # BLD AUTO: 4.3 K/UL (ref 4.8–10.8)

## 2022-10-24 PROCEDURE — A9270 NON-COVERED ITEM OR SERVICE: HCPCS | Performed by: SURGERY

## 2022-10-24 PROCEDURE — 700102 HCHG RX REV CODE 250 W/ 637 OVERRIDE(OP): Performed by: SURGERY

## 2022-10-24 PROCEDURE — 85027 COMPLETE CBC AUTOMATED: CPT

## 2022-10-24 PROCEDURE — 700111 HCHG RX REV CODE 636 W/ 250 OVERRIDE (IP): Performed by: PHYSICIAN ASSISTANT

## 2022-10-24 PROCEDURE — 770001 HCHG ROOM/CARE - MED/SURG/GYN PRIV*

## 2022-10-24 PROCEDURE — 700111 HCHG RX REV CODE 636 W/ 250 OVERRIDE (IP): Performed by: NURSE PRACTITIONER

## 2022-10-24 PROCEDURE — 92610 EVALUATE SWALLOWING FUNCTION: CPT

## 2022-10-24 PROCEDURE — 97530 THERAPEUTIC ACTIVITIES: CPT

## 2022-10-24 PROCEDURE — 86140 C-REACTIVE PROTEIN: CPT

## 2022-10-24 PROCEDURE — 36415 COLL VENOUS BLD VENIPUNCTURE: CPT

## 2022-10-24 PROCEDURE — 99232 SBSQ HOSP IP/OBS MODERATE 35: CPT | Performed by: NURSE PRACTITIONER

## 2022-10-24 PROCEDURE — 80048 BASIC METABOLIC PNL TOTAL CA: CPT

## 2022-10-24 PROCEDURE — 97116 GAIT TRAINING THERAPY: CPT

## 2022-10-24 PROCEDURE — 84134 ASSAY OF PREALBUMIN: CPT

## 2022-10-24 RX ADMIN — OXYCODONE HYDROCHLORIDE 10 MG: 10 TABLET ORAL at 01:31

## 2022-10-24 RX ADMIN — HYDROMORPHONE HYDROCHLORIDE 0.5 MG: 1 INJECTION, SOLUTION INTRAMUSCULAR; INTRAVENOUS; SUBCUTANEOUS at 18:14

## 2022-10-24 RX ADMIN — METAXALONE 400 MG: 800 TABLET ORAL at 05:19

## 2022-10-24 RX ADMIN — OXYCODONE HYDROCHLORIDE 10 MG: 10 TABLET ORAL at 16:11

## 2022-10-24 RX ADMIN — GABAPENTIN 100 MG: 100 CAPSULE ORAL at 21:03

## 2022-10-24 RX ADMIN — POLYETHYLENE GLYCOL 3350 1 PACKET: 17 POWDER, FOR SOLUTION ORAL at 05:19

## 2022-10-24 RX ADMIN — METAXALONE 400 MG: 800 TABLET ORAL at 12:55

## 2022-10-24 RX ADMIN — POLYETHYLENE GLYCOL 3350 1 PACKET: 17 POWDER, FOR SOLUTION ORAL at 17:59

## 2022-10-24 RX ADMIN — ENOXAPARIN SODIUM 30 MG: 30 INJECTION SUBCUTANEOUS at 17:59

## 2022-10-24 RX ADMIN — OXYCODONE HYDROCHLORIDE 10 MG: 10 TABLET ORAL at 20:54

## 2022-10-24 RX ADMIN — HYDROMORPHONE HYDROCHLORIDE 0.5 MG: 1 INJECTION, SOLUTION INTRAMUSCULAR; INTRAVENOUS; SUBCUTANEOUS at 09:37

## 2022-10-24 RX ADMIN — ENOXAPARIN SODIUM 30 MG: 30 INJECTION SUBCUTANEOUS at 05:20

## 2022-10-24 RX ADMIN — DOCUSATE SODIUM 100 MG: 50 LIQUID ORAL at 05:20

## 2022-10-24 RX ADMIN — GABAPENTIN 100 MG: 100 CAPSULE ORAL at 05:20

## 2022-10-24 RX ADMIN — OXYCODONE HYDROCHLORIDE 10 MG: 10 TABLET ORAL at 12:56

## 2022-10-24 RX ADMIN — METAXALONE 400 MG: 800 TABLET ORAL at 17:59

## 2022-10-24 RX ADMIN — BACITRACIN ZINC: 500 OINTMENT TOPICAL at 09:38

## 2022-10-24 RX ADMIN — AMLODIPINE BESYLATE 10 MG: 10 TABLET ORAL at 05:19

## 2022-10-24 RX ADMIN — OXYCODONE HYDROCHLORIDE 10 MG: 10 TABLET ORAL at 05:20

## 2022-10-24 RX ADMIN — OMEPRAZOLE 40 MG: KIT at 05:18

## 2022-10-24 RX ADMIN — DOCUSATE SODIUM 100 MG: 50 LIQUID ORAL at 17:59

## 2022-10-24 ASSESSMENT — GAIT ASSESSMENTS
DISTANCE (FEET): 15
ASSISTIVE DEVICE: FRONT WHEEL WALKER
GAIT LEVEL OF ASSIST: MINIMAL ASSIST
DEVIATION: STEP TO;OTHER (COMMENT)

## 2022-10-24 ASSESSMENT — COGNITIVE AND FUNCTIONAL STATUS - GENERAL
SUGGESTED CMS G CODE MODIFIER MOBILITY: CL
MOVING TO AND FROM BED TO CHAIR: UNABLE
CLIMB 3 TO 5 STEPS WITH RAILING: TOTAL
MOVING FROM LYING ON BACK TO SITTING ON SIDE OF FLAT BED: UNABLE
WALKING IN HOSPITAL ROOM: A LITTLE
STANDING UP FROM CHAIR USING ARMS: A LITTLE
TURNING FROM BACK TO SIDE WHILE IN FLAT BAD: UNABLE
MOBILITY SCORE: 10

## 2022-10-24 ASSESSMENT — ENCOUNTER SYMPTOMS
ABDOMINAL PAIN: 0
COUGH: 0
FEVER: 0
MYALGIAS: 1
SHORTNESS OF BREATH: 0
NECK PAIN: 1
EYES NEGATIVE: 1
CHILLS: 0
VOMITING: 0
CARDIOVASCULAR NEGATIVE: 1
BACK PAIN: 1
SENSORY CHANGE: 1
NAUSEA: 0

## 2022-10-24 ASSESSMENT — PAIN DESCRIPTION - PAIN TYPE
TYPE: ACUTE PAIN
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
## 2022-10-24 NOTE — PROGRESS NOTES
Palliative Care   Patient sleeping with even unlabored respirations.  We will [place] back later today to discuss advance care planning documents.  AD/POLST form.    JONI Ward.  Palliative Care Nurse Practitioner  [phone]
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
## 2022-10-24 NOTE — DISCHARGE PLANNING
Would appreciate updated [place] evals to assess ability/willingness to participate/tolerate IRF level of [place] once appropriate.  [employer] OON- auth required - OON bene:  deductible $1000/$138.43 met, OOP $10,000/$138.43 met, copay $0, co-insurance 45%.

## 2022-10-24 NOTE — CARE PLAN
The patient is Stable - Low risk of patient condition declining or worsening    Shift Goals  Clinical Goals: No complications related to neuro status; Pain management; Adequate enteral intake  Patient Goals: Sleep comfortably; Pain control  Family Goals: n/a    Progress made toward(s) clinical / shift goals:  Patient remains alert an oriented. Neuro checks continued throughout the shift. No mental status changes or complications related to spinal injury/surgery have been observed. Patient continues pain management, as ordered. Enteral feedings encouraged and administered. No signs of distress have been observed. Will continue to monitor.       Problem: Knowledge Deficit - Standard  Goal: Patient and family/care givers will demonstrate understanding of plan of care, disease process/condition, diagnostic tests and medications  Outcome: Progressing     Problem: Pain - Standard  Goal: Alleviation of pain or a reduction in pain to the patient’s comfort goal  Outcome: Progressing     Problem: Skin Integrity  Goal: Skin integrity is maintained or improved  Outcome: Progressing     Problem: Fall Risk  Goal: Patient will remain free from falls  Outcome: Progressing     Problem: Neuro Status  Goal: Neuro status will remain stable or improve  Outcome: Progressing

## 2022-10-24 NOTE — PROGRESS NOTES
Palliative Care   Patient fatigued following left arm dressing change.  Revisited completion of advance directive and POLST form.  Discussed complementary notary is not available until Tuesday.  Patient reports feeling worn out from dressing change and would like to defer completion of documents.  Discussed that if patient discharges to [facility] we can assist him with completion of documents there.    JONI Ward.  Palliative Care Nurse Practitioner  [phone]

## 2022-10-25 PROCEDURE — 99232 SBSQ HOSP IP/OBS MODERATE 35: CPT | Performed by: NURSE PRACTITIONER

## 2022-10-25 PROCEDURE — 92612 ENDOSCOPY SWALLOW (FEES) VID: CPT

## 2022-10-25 PROCEDURE — 94760 N-INVAS EAR/PLS OXIMETRY 1: CPT

## 2022-10-25 PROCEDURE — 700102 HCHG RX REV CODE 250 W/ 637 OVERRIDE(OP): Performed by: SURGERY

## 2022-10-25 PROCEDURE — 770001 HCHG ROOM/CARE - MED/SURG/GYN PRIV*

## 2022-10-25 PROCEDURE — A9270 NON-COVERED ITEM OR SERVICE: HCPCS | Performed by: SURGERY

## 2022-10-25 PROCEDURE — 700111 HCHG RX REV CODE 636 W/ 250 OVERRIDE (IP): Performed by: PHYSICIAN ASSISTANT

## 2022-10-25 PROCEDURE — 94669 MECHANICAL CHEST WALL OSCILL: CPT

## 2022-10-25 RX ADMIN — ENOXAPARIN SODIUM 30 MG: 30 INJECTION SUBCUTANEOUS at 04:39

## 2022-10-25 RX ADMIN — POLYETHYLENE GLYCOL 3350 1 PACKET: 17 POWDER, FOR SOLUTION ORAL at 17:37

## 2022-10-25 RX ADMIN — OXYCODONE HYDROCHLORIDE 10 MG: 10 TABLET ORAL at 21:14

## 2022-10-25 RX ADMIN — BACITRACIN ZINC: 500 OINTMENT TOPICAL at 12:36

## 2022-10-25 RX ADMIN — METAXALONE 400 MG: 800 TABLET ORAL at 12:35

## 2022-10-25 RX ADMIN — OXYCODONE HYDROCHLORIDE 10 MG: 10 TABLET ORAL at 09:14

## 2022-10-25 RX ADMIN — SENNOSIDES AND DOCUSATE SODIUM 1 TABLET: 50; 8.6 TABLET ORAL at 21:15

## 2022-10-25 RX ADMIN — GABAPENTIN 100 MG: 100 CAPSULE ORAL at 13:59

## 2022-10-25 RX ADMIN — AMLODIPINE BESYLATE 10 MG: 10 TABLET ORAL at 04:38

## 2022-10-25 RX ADMIN — OXYCODONE HYDROCHLORIDE 10 MG: 10 TABLET ORAL at 00:10

## 2022-10-25 RX ADMIN — METAXALONE 400 MG: 800 TABLET ORAL at 17:36

## 2022-10-25 RX ADMIN — DOCUSATE SODIUM 100 MG: 50 LIQUID ORAL at 17:37

## 2022-10-25 RX ADMIN — OXYCODONE HYDROCHLORIDE 10 MG: 10 TABLET ORAL at 12:35

## 2022-10-25 RX ADMIN — OMEPRAZOLE 40 MG: KIT at 06:11

## 2022-10-25 RX ADMIN — ACETAMINOPHEN 650 MG: 325 TABLET, FILM COATED ORAL at 17:36

## 2022-10-25 RX ADMIN — DOCUSATE SODIUM 100 MG: 50 LIQUID ORAL at 04:38

## 2022-10-25 RX ADMIN — METAXALONE 400 MG: 800 TABLET ORAL at 04:38

## 2022-10-25 RX ADMIN — ENOXAPARIN SODIUM 30 MG: 30 INJECTION SUBCUTANEOUS at 17:37

## 2022-10-25 RX ADMIN — OXYCODONE HYDROCHLORIDE 10 MG: 10 TABLET ORAL at 04:38

## 2022-10-25 RX ADMIN — GABAPENTIN 100 MG: 100 CAPSULE ORAL at 21:15

## 2022-10-25 RX ADMIN — GABAPENTIN 100 MG: 100 CAPSULE ORAL at 04:38

## 2022-10-25 ASSESSMENT — ENCOUNTER SYMPTOMS
FEVER: 0
NAUSEA: 0
SHORTNESS OF BREATH: 0
ABDOMINAL PAIN: 0
BACK PAIN: 1
EYES NEGATIVE: 1
SENSORY CHANGE: 1
VOMITING: 0
CARDIOVASCULAR NEGATIVE: 1
COUGH: 0
NECK PAIN: 1
CHILLS: 0
MYALGIAS: 1

## 2022-10-25 ASSESSMENT — PAIN DESCRIPTION - PAIN TYPE
TYPE: ACUTE PAIN

## 2022-10-25 NOTE — DISCHARGE PLANNING
[phone]  Spoke with wife, Gladys, @[phone] in regard to choices.  He wants her to choice.  She choice only one [facility]. Rehab.  She has had both parents there.      While on phone she mentioned [facility] requested to send there also.      Will send to Tash BISWAS.
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
## 2022-10-25 NOTE — DISCHARGE PLANNING
Received Choice Form @: 1759 10/24/22  Agency/ Facility Name: [facility]   Referral Sent per Choice Form @: [id number]    Agency/Facility Name: [facility]  Spoke To: Corinne   Outcome: DPA called to get updated referral status, Corinne to look over referral and send answer    LSW notified

## 2022-10-25 NOTE — PROGRESS NOTES
Labs reviewed with provider. No new orders received at this time.  Provider notified of increased drainage at the LEFT arm wound and current wound characteristics.  Will continue to monitor.
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
## 2022-10-25 NOTE — CARE PLAN
Problem: Knowledge Deficit - Standard  Goal: Patient and family/care givers will demonstrate understanding of plan of care, disease process/condition, diagnostic tests and medications  Outcome: Progressing     Problem: Pain - Standard  Goal: Alleviation of pain or a reduction in pain to the patient’s comfort goal  Outcome: Progressing     Problem: Skin Integrity  Goal: Skin integrity is maintained or improved  Outcome: Progressing     Problem: Fall Risk  Goal: Patient will remain free from falls  Outcome: Progressing     Problem: Neuro Status  Goal: Neuro status will remain stable or improve  Outcome: Progressing   The patient is Stable - Low risk of patient condition declining or worsening    Shift Goals  Clinical Goals: pain control, safety, adequate tube feeds  Patient Goals: manage pain  Family Goals: n/a    Progress made toward(s) clinical / shift goals:  Patient is aox4. Moving around in bed in a little better to prevent future pressure injuries. Left arm laceration and wound dressing is still dry clean and intact. Patient uses call light when he needs something and knows not to get up without assistance to prevent falls.     Patient is not progressing towards the following goals:patient pain level is managed today with OTC pain medication but still is experiencing 8/10 pain at points in the day. Patient is still not tolerating appropriate amount of tube feeds.

## 2022-10-25 NOTE — PROGRESS NOTES
Patient was given pain med and then worked with therapy. Patient tolerated well. Had a big BM on the tiolet. Patient ambulated with FWW. Patient is sitting up in chair with chair alarm in place and TLSO brace in place. Tolerating well.

## 2022-10-25 NOTE — THERAPY
"Physical Therapy   Daily Treatment     Patient Name: Terence Mckeon Jr.  Age:  72 y.o., Sex:  male  Medical Record #: [id number]  Today's Date: 10/24/2022     Precautions  Precautions: Fall Risk;Cervical Collar  ;TLSO (Thoracolumbosacral orthosis);Spinal / Back Precautions ;Swallow Precautions ( See Comments) (c-collar at all times, TLSO when EOB)  Comments: Prior R BKA w/prosthetic    Assessment  Coordinated with RN regarding pain medications and therapy session this date. Pt currently requires modA for bed mobility, Oli for functional transfers, and Oli to ambulate 2x 15' w/FWW this date. R BKA prosthetic utilized for session; of which, pt required Oli to manage, as well as required maxA to manage TLSO. With activity pacing strategies, pt demonstrated fair overall tolerance to activities. Pt was previously independent, and maintains preferences to be self-directed and independent once again. Currently recommend IRF upon D/C to improve functional mobility and functional independence.     Plan    Continue current treatment plan.    DC Equipment Recommendations: Unable to determine at this time  Discharge Recommendations: Recommend post-acute placement for additional physical therapy services prior to discharge home (IRF)      Subjective    Pt requesting a \"heads up\" on therapy times/sessions, and prefers to coordinate with functional tasks.     Objective     10/24/22 1700   Charge Group   Charges  Yes  (Pt seen [phone] and returned [phone])   PT Gait Training 1   PT Therapeutic Activities 2   Precautions   Precautions Fall Risk;Cervical Collar  ;TLSO (Thoracolumbosacral orthosis);Spinal / Back Precautions ;Swallow Precautions ( See Comments)  (c-collar at all times, TLSO when EOB)   Comments Prior R BKA w/prosthetic   Vitals   Pulse Oximetry 99 %   O2 (LPM) 2   O2 Delivery Device Silicone Nasal Cannula   Vitals Comments O2 sats 99% on room air with activity. on 2L pre/post- activity   Pain   Pain " "Scales 0 to 10 Scale    Pain 0 - 10 Group   Location Generalized   Pain Rating Scale (NPRS) 5   Cognition    Speech/ Communication Hard of Hearing   Balance   Sitting Balance (Static) Fair   Sitting Balance (Dynamic) Fair -   Standing Balance (Static) Fair -   Gait Analysis   Gait Level Of Assist Minimal Assist   Assistive Device Front Wheel Walker   Distance (Feet) 15   # of Times Distance was Traveled 2   Deviation Step To;Other (Comment)  (decreased dawit, discontinous advancement of AD with stepping, mild postural sway)   Skilled Intervention Verbal Cuing;Postural Facilitation;Sequencing   Comments With R prosthetic on   Bed Mobility    Supine to Sit Moderate Assist  (assist with rolling hips and elevating trunk upright)   Rolling Minimum Assist to Lt.  (with use of side rail)   Functional Mobility   Sit to Stand Minimal Assist  (completed 2x to allow for adequate fit and donning of RLE prosthetic)   Toilet Transfers Minimal Assist  (with grab bars and FWW. Pt able to perform hygiene with SBA)   Mobility Pt required maxA to belen TLSO while sitting EOB. Pt able to belen R residual limb gel liner with SBA in bed, and additional Oli provided for securing R BKA in prosthetic via \"suspension sleeve\"   Skilled Intervention Verbal Cuing;Sequencing   How much difficulty does the patient currently have...   Turning over in bed (including adjusting bedclothes, sheets and blankets)? 1   Sitting down on and standing up from a chair with arms (e.g., wheelchair, bedside commode, etc.) 1   Moving from lying on back to sitting on the side of the bed? 1   How much help from another person does the patient currently need...   Moving to and from a bed to a chair (including a wheelchair)? 3   Need to walk in a hospital room? 3   Climbing 3-5 steps with a railing? 1   6 clicks Mobility Score 10   Short Term Goals    Short Term Goal # 1 Pt will perform bed mobility via log roll with supervision to progress function in 6 visits. "   Goal Outcome # 1 Progressing as expected   Short Term Goal # 2 Pt will perform functional transfers with supervision and FWW to progress mobility in 6 visits.   Goal Outcome # 2 Progressing as expected   Short Term Goal # 3 Pt will ambulate 100ft with FWW and sueprvision to progress mobility in 6 visits.   Goal Outcome # 3 Progressing as expected   Education Group   Education Provided Role of Physical Therapist;Gait Training;Use of Assistive Device   Additional Comments Significant time spent discussing expectations of recovery process, implementation of therapy services, and expectations with rehab. Discussed coordinating sessions as able, and importance of utilzing RLE prosthetic to avoid excessive swelling of limb with non-use   Anticipated Discharge Equipment and Recommendations   DC Equipment Recommendations Unable to determine at this time   Discharge Recommendations Recommend post-acute placement for additional physical therapy services prior to discharge home  (IRF)   Interdisciplinary Plan of Care Collaboration   IDT Collaboration with  Nursing   Patient Position at End of Therapy Seated;Call Light within Reach;Tray Table within Reach  (bed alarm not on prior to arrival)   Collaboration Comments RN updated on performance   Session Information   Date / Session Number  10/24- 2(2/4, 10/26) attempted 10/21

## 2022-10-25 NOTE — CARE PLAN
The patient is Stable - Low risk of patient condition declining or worsening    Shift Goals  Clinical Goals: Pain management effective; No complications related to neuro/spine; Adequate enteral intake  Patient Goals: Pain control; Sleep  Family Goals: n/a    Progress made toward(s) clinical / shift goals:  Patient remains alert and oriented X 4. Neuro checks continued, as ordered with no new complications observed. Patient continues pain management, as ordered. No signs of distress have been observed. Will continue to monitor.   Problem: Knowledge Deficit - Standard  Goal: Patient and family/care givers will demonstrate understanding of plan of care, disease process/condition, diagnostic tests and medications  Outcome: Progressing     Problem: Pain - Standard  Goal: Alleviation of pain or a reduction in pain to the patient’s comfort goal  Outcome: Progressing     Problem: Skin Integrity  Goal: Skin integrity is maintained or improved  Outcome: Progressing     Problem: Fall Risk  Goal: Patient will remain free from falls  Outcome: Progressing     Problem: Neuro Status  Goal: Neuro status will remain stable or improve  Outcome: Progressing       Patient is not progressing towards the following goals: Patient continues to request that scheduled enteral feedings be postponed until later in the shift. Will continue to monitor GI status, educate and encourage patient.

## 2022-10-25 NOTE — DISCHARGE PLANNING
Follow up for post acute services chart review choice indicated for skilled nursing in [place]. [facility] no longer following.

## 2022-10-26 PROCEDURE — 99232 SBSQ HOSP IP/OBS MODERATE 35: CPT | Performed by: NURSE PRACTITIONER

## 2022-10-26 PROCEDURE — 700102 HCHG RX REV CODE 250 W/ 637 OVERRIDE(OP): Performed by: SURGERY

## 2022-10-26 PROCEDURE — 97602 WOUND(S) CARE NON-SELECTIVE: CPT

## 2022-10-26 PROCEDURE — 700102 HCHG RX REV CODE 250 W/ 637 OVERRIDE(OP): Performed by: NURSE PRACTITIONER

## 2022-10-26 PROCEDURE — A9270 NON-COVERED ITEM OR SERVICE: HCPCS | Performed by: SURGERY

## 2022-10-26 PROCEDURE — 700111 HCHG RX REV CODE 636 W/ 250 OVERRIDE (IP): Performed by: PHYSICIAN ASSISTANT

## 2022-10-26 PROCEDURE — A9270 NON-COVERED ITEM OR SERVICE: HCPCS | Performed by: NURSE PRACTITIONER

## 2022-10-26 PROCEDURE — 97530 THERAPEUTIC ACTIVITIES: CPT

## 2022-10-26 PROCEDURE — 770001 HCHG ROOM/CARE - MED/SURG/GYN PRIV*

## 2022-10-26 RX ORDER — GABAPENTIN 100 MG/1
200 CAPSULE ORAL EVERY 8 HOURS
Status: DISCONTINUED | OUTPATIENT
Start: 2022-10-26 | End: 2022-11-01 | Stop reason: HOSPADM

## 2022-10-26 RX ADMIN — OXYCODONE HYDROCHLORIDE 10 MG: 10 TABLET ORAL at 04:35

## 2022-10-26 RX ADMIN — OXYCODONE HYDROCHLORIDE 10 MG: 10 TABLET ORAL at 07:52

## 2022-10-26 RX ADMIN — BACITRACIN ZINC: 500 OINTMENT TOPICAL at 12:25

## 2022-10-26 RX ADMIN — ENOXAPARIN SODIUM 30 MG: 30 INJECTION SUBCUTANEOUS at 16:57

## 2022-10-26 RX ADMIN — GABAPENTIN 200 MG: 100 CAPSULE ORAL at 14:46

## 2022-10-26 RX ADMIN — GABAPENTIN 200 MG: 100 CAPSULE ORAL at 20:56

## 2022-10-26 RX ADMIN — ENOXAPARIN SODIUM 30 MG: 30 INJECTION SUBCUTANEOUS at 04:36

## 2022-10-26 RX ADMIN — METAXALONE 400 MG: 800 TABLET ORAL at 12:04

## 2022-10-26 RX ADMIN — METAXALONE 400 MG: 800 TABLET ORAL at 16:57

## 2022-10-26 RX ADMIN — METAXALONE 400 MG: 800 TABLET ORAL at 04:35

## 2022-10-26 RX ADMIN — GABAPENTIN 100 MG: 100 CAPSULE ORAL at 04:36

## 2022-10-26 RX ADMIN — OXYCODONE HYDROCHLORIDE 10 MG: 10 TABLET ORAL at 00:56

## 2022-10-26 RX ADMIN — OXYCODONE HYDROCHLORIDE 10 MG: 10 TABLET ORAL at 14:46

## 2022-10-26 RX ADMIN — OXYCODONE HYDROCHLORIDE 10 MG: 10 TABLET ORAL at 10:41

## 2022-10-26 RX ADMIN — OMEPRAZOLE 40 MG: KIT at 04:37

## 2022-10-26 RX ADMIN — SENNOSIDES AND DOCUSATE SODIUM 1 TABLET: 50; 8.6 TABLET ORAL at 20:56

## 2022-10-26 RX ADMIN — OXYCODONE HYDROCHLORIDE 10 MG: 10 TABLET ORAL at 20:55

## 2022-10-26 RX ADMIN — AMLODIPINE BESYLATE 10 MG: 10 TABLET ORAL at 04:36

## 2022-10-26 ASSESSMENT — ENCOUNTER SYMPTOMS
HEADACHES: 0
NAUSEA: 0
VOMITING: 0
MYALGIAS: 1
SENSORY CHANGE: 1
BLURRED VISION: 0
SHORTNESS OF BREATH: 0
DOUBLE VISION: 0
NECK PAIN: 1
SPEECH CHANGE: 0
ABDOMINAL PAIN: 0
CHILLS: 0
BACK PAIN: 1
CONSTIPATION: 0
ROS GI COMMENTS: BM 10/26
DIZZINESS: 0
FOCAL WEAKNESS: 0
FEVER: 0

## 2022-10-26 ASSESSMENT — COGNITIVE AND FUNCTIONAL STATUS - GENERAL
WALKING IN HOSPITAL ROOM: A LITTLE
SUGGESTED CMS G CODE MODIFIER MOBILITY: CL
MOVING FROM LYING ON BACK TO SITTING ON SIDE OF FLAT BED: UNABLE
MOBILITY SCORE: 11
CLIMB 3 TO 5 STEPS WITH RAILING: A LOT
MOVING TO AND FROM BED TO CHAIR: UNABLE
STANDING UP FROM CHAIR USING ARMS: A LITTLE
TURNING FROM BACK TO SIDE WHILE IN FLAT BAD: UNABLE

## 2022-10-26 ASSESSMENT — PAIN DESCRIPTION - PAIN TYPE: TYPE: ACUTE PAIN

## 2022-10-26 ASSESSMENT — GAIT ASSESSMENTS
ASSISTIVE DEVICE: FRONT WHEEL WALKER
DEVIATION: STEP TO
GAIT LEVEL OF ASSIST: CONTACT GUARD ASSIST
DISTANCE (FEET): 15

## 2022-10-26 NOTE — THERAPY
"Physical Therapy   Daily Treatment     Patient Name: Terence Mckeon Jr.  Age:  72 y.o., Sex:  male  Medical Record #: [id number]  Today's Date: 10/26/2022     Precautions  Precautions: Fall Risk;Cervical Collar  ;TLSO (Thoracolumbosacral orthosis);Swallow Precautions ( See Comments)  Comments: prior R BKA with prosthetic    Assessment    Patient continues to be limited by back pain but was able to mobilize as detailed below. They required min A to perform bed mobility and verbalized need for bathroom once sitting. During ambulation they demonstrated decreased clearance and dawit but were able to don prosthetic with min A and able to direct care. Provided education regarding importance of OOB activity and patient verbalized understanding. Will continue to follow.    Plan    Continue current treatment plan.    DC Equipment Recommendations: Unable to determine at this time  Discharge Recommendations: Recommend post-acute placement for additional physical therapy services prior to discharge home      Subjective    \"I need my pain meds.\"     Objective       10/26/22 1137   Charge Group   Charges  Yes   PT Therapeutic Activities 2  (23 min)   Precautions   Precautions Fall Risk;Cervical Collar  ;TLSO (Thoracolumbosacral orthosis);Swallow Precautions ( See Comments)   Comments prior R BKA with prosthetic   Vitals   O2 (LPM) 2   O2 Delivery Device Silicone Nasal Cannula   Pain 0 - 10 Group   Location Back   Therapist Pain Assessment During Activity;Nurse Notified   Cognition    Cognition / Consciousness X   Speech/ Communication Hard of Hearing   Level of Consciousness Alert   Comments limited by pain but pleasant   Balance   Sitting Balance (Static) Fair +   Sitting Balance (Dynamic) Fair   Standing Balance (Static) Fair   Standing Balance (Dynamic) Fair -   Weight Shift Sitting Good   Weight Shift Standing Fair   Skilled Intervention Verbal Cuing;Compensatory Strategies   Comments able to weight shift laterally " while on toilet for pericare with no LOB. standing with FWW   Gait Analysis   Gait Level Of Assist Contact Guard Assist   Assistive Device Front Wheel Walker   Distance (Feet) 15   # of Times Distance was Traveled 2   Deviation Step To  (decreased dawit, step length)   # of Stairs Climbed 0   Weight Bearing Status no restrictions   Vision Deficits Impacting Mobility NT   Skilled Intervention Verbal Cuing;Compensatory Strategies   Bed Mobility    Supine to Sit Minimal Assist  (for trunk to upright)   Sit to Supine   (NT, left in chair)   Scooting Supervised  (seated)   Rolling Minimum Assist to Lt.   Skilled Intervention Verbal Cuing;Compensatory Strategies;Facilitation   Comments instructed log roll   Functional Mobility   Sit to Stand Contact Guard Assist   Bed, Chair, Wheelchair Transfer Contact Guard Assist   Toilet Transfers Contact Guard Assist   Transfer Method Stand Step   Skilled Intervention Verbal Cuing;Compensatory Strategies   How much difficulty does the patient currently have...   Turning over in bed (including adjusting bedclothes, sheets and blankets)? 1   Sitting down on and standing up from a chair with arms (e.g., wheelchair, bedside commode, etc.) 1   Moving from lying on back to sitting on the side of the bed? 1   How much help from another person does the patient currently need...   Moving to and from a bed to a chair (including a wheelchair)? 3   Need to walk in a hospital room? 3   Climbing 3-5 steps with a railing? 2   6 clicks Mobility Score 11   Activity Tolerance   Sitting in Chair left in chair   Sitting Edge of Bed 5-6 min   Standing 3-4 min   Comments limited by back pain   Short Term Goals    Short Term Goal # 1 Pt will perform bed mobility via log roll with supervision to progress function in 6 visits.   Goal Outcome # 1 Progressing as expected   Short Term Goal # 2 Pt will perform functional transfers with supervision and FWW to progress mobility in 6 visits.   Goal Outcome # 2  Progressing as expected   Short Term Goal # 3 Pt will ambulate 100ft with FWW and sueprvision to progress mobility in 6 visits.   Goal Outcome # 3 Goal not met   Anticipated Discharge Equipment and Recommendations   DC Equipment Recommendations Unable to determine at this time   Discharge Recommendations Recommend post-acute placement for additional physical therapy services prior to discharge home   Interdisciplinary Plan of Care Collaboration   IDT Collaboration with  Nursing   Patient Position at End of Therapy Seated;Chair Alarm On;Call Light within Reach;Tray Table within Reach;Phone within Reach   Collaboration Comments RN aware of visit, response   Session Information   Date / Session Number  10/26-3 (3/4, 10/26) attempted 10/21

## 2022-10-26 NOTE — CARE PLAN
The patient is Stable - Low risk of patient condition declining or worsening    Shift Goals  Clinical Goals: Ambulate, pain management  Patient Goals: Pain control  Family Goals: BIJU    Progress made toward(s) clinical / shift goals:    Problem: Skin Integrity  Goal: Skin integrity is maintained or improved  Outcome: Progressing     Problem: Neuro Status  Goal: Neuro status will remain stable or improve  Outcome: Progressing       Patient is not progressing towards the following goals:

## 2022-10-26 NOTE — DISCHARGE PLANNING
Agency/Facility Name: [facility]  Outcome: DPA left vmail requesting update on referral     Received Choice Form @: 1308  Agency/ Facility Name: [facility], [place], [facility]  Referral Sent per Choice Form @: 6782
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
## 2022-10-26 NOTE — CARE PLAN
Problem: Knowledge Deficit - Standard  Goal: Patient and family/care givers will demonstrate understanding of plan of care, disease process/condition, diagnostic tests and medications  Outcome: Progressing     Problem: Pain - Standard  Goal: Alleviation of pain or a reduction in pain to the patient’s comfort goal  Outcome: Progressing     Problem: Skin Integrity  Goal: Skin integrity is maintained or improved  Outcome: Progressing     Problem: Fall Risk  Goal: Patient will remain free from falls  Outcome: Progressing     Problem: Neuro Status  Goal: Neuro status will remain stable or improve  Outcome: Progressing   The patient is Stable - Low risk of patient condition declining or worsening    Shift Goals  Clinical Goals: pain management  Patient Goals: pain management  Family Goals: natividad    Progress made toward(s) clinical / shift goals:  stable neuro status    Patient is not progressing towards the following goals:
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
## 2022-10-26 NOTE — DISCHARGE PLANNING
Per ZULAY/Nancy, haven't received determinations from the two SNF referrals sent, requesting that patient/wife expand SNF choices. Per CM note, patient requested that his wife/Gladys chooses SNF.    Gladys not at bedside, LSW left  requesting return call ([phone]).    ADDENDUM 1256PM:    LSW received return call from patient's wife/Gladys. Gladys added three SNF options: [facility], [facility], and [facility]. LSW faxed updated choice form to ZULAY.

## 2022-10-26 NOTE — CARE PLAN
The patient is Stable - Low risk of patient condition declining or worsening    Shift Goals  Clinical Goals: Pain management; No complications related to neuro/spinal injury; Patient will receive enteral feeding  Patient Goals: Pain control; Sleep  Family Goals: BIJU    Progress made toward(s) clinical / shift goals:  Patient remains alert and oriented. Pain management continued, as ordered. Neuro checks continued, as ordered with no new complications observed. Enteral feeds encouraged and administered. Patient tolerated well. No signs of distress have been observed. Will continue to monitor.   Problem: Knowledge Deficit - Standard  Goal: Patient and family/care givers will demonstrate understanding of plan of care, disease process/condition, diagnostic tests and medications  Outcome: Progressing     Problem: Pain - Standard  Goal: Alleviation of pain or a reduction in pain to the patient’s comfort goal  Outcome: Progressing     Problem: Skin Integrity  Goal: Skin integrity is maintained or improved  Outcome: Progressing     Problem: Fall Risk  Goal: Patient will remain free from falls  Outcome: Progressing     Problem: Neuro Status  Goal: Neuro status will remain stable or improve  Outcome: Progressing       Patient is not progressing towards the following goals:

## 2022-10-27 PROCEDURE — 97535 SELF CARE MNGMENT TRAINING: CPT | Mod: CO

## 2022-10-27 PROCEDURE — 700111 HCHG RX REV CODE 636 W/ 250 OVERRIDE (IP): Performed by: PHYSICIAN ASSISTANT

## 2022-10-27 PROCEDURE — 700102 HCHG RX REV CODE 250 W/ 637 OVERRIDE(OP): Performed by: NURSE PRACTITIONER

## 2022-10-27 PROCEDURE — A9270 NON-COVERED ITEM OR SERVICE: HCPCS | Performed by: SURGERY

## 2022-10-27 PROCEDURE — 700102 HCHG RX REV CODE 250 W/ 637 OVERRIDE(OP): Performed by: SURGERY

## 2022-10-27 PROCEDURE — 97530 THERAPEUTIC ACTIVITIES: CPT | Mod: CO

## 2022-10-27 PROCEDURE — A9270 NON-COVERED ITEM OR SERVICE: HCPCS | Performed by: NURSE PRACTITIONER

## 2022-10-27 PROCEDURE — 99232 SBSQ HOSP IP/OBS MODERATE 35: CPT | Performed by: NURSE PRACTITIONER

## 2022-10-27 PROCEDURE — 770001 HCHG ROOM/CARE - MED/SURG/GYN PRIV*

## 2022-10-27 RX ADMIN — OXYCODONE 5 MG: 5 TABLET ORAL at 20:51

## 2022-10-27 RX ADMIN — SENNOSIDES AND DOCUSATE SODIUM 1 TABLET: 50; 8.6 TABLET ORAL at 20:51

## 2022-10-27 RX ADMIN — BACITRACIN ZINC: 500 OINTMENT TOPICAL at 12:26

## 2022-10-27 RX ADMIN — MAGNESIUM HYDROXIDE 30 ML: 400 SUSPENSION ORAL at 05:27

## 2022-10-27 RX ADMIN — OXYCODONE 5 MG: 5 TABLET ORAL at 09:09

## 2022-10-27 RX ADMIN — GABAPENTIN 200 MG: 100 CAPSULE ORAL at 05:28

## 2022-10-27 RX ADMIN — AMLODIPINE BESYLATE 10 MG: 10 TABLET ORAL at 05:27

## 2022-10-27 RX ADMIN — METAXALONE 400 MG: 800 TABLET ORAL at 05:28

## 2022-10-27 RX ADMIN — ENOXAPARIN SODIUM 30 MG: 30 INJECTION SUBCUTANEOUS at 17:16

## 2022-10-27 RX ADMIN — GABAPENTIN 200 MG: 100 CAPSULE ORAL at 20:51

## 2022-10-27 RX ADMIN — ENOXAPARIN SODIUM 30 MG: 30 INJECTION SUBCUTANEOUS at 05:28

## 2022-10-27 RX ADMIN — METAXALONE 400 MG: 800 TABLET ORAL at 12:27

## 2022-10-27 RX ADMIN — OMEPRAZOLE 40 MG: KIT at 05:28

## 2022-10-27 RX ADMIN — OXYCODONE HYDROCHLORIDE 10 MG: 10 TABLET ORAL at 12:26

## 2022-10-27 RX ADMIN — OXYCODONE HYDROCHLORIDE 10 MG: 10 TABLET ORAL at 00:55

## 2022-10-27 RX ADMIN — GABAPENTIN 200 MG: 100 CAPSULE ORAL at 14:11

## 2022-10-27 RX ADMIN — DOCUSATE SODIUM 100 MG: 50 LIQUID ORAL at 05:27

## 2022-10-27 RX ADMIN — POLYETHYLENE GLYCOL 3350 1 PACKET: 17 POWDER, FOR SOLUTION ORAL at 05:28

## 2022-10-27 RX ADMIN — DOCUSATE SODIUM 100 MG: 50 LIQUID ORAL at 17:17

## 2022-10-27 RX ADMIN — METAXALONE 400 MG: 800 TABLET ORAL at 17:17

## 2022-10-27 ASSESSMENT — COGNITIVE AND FUNCTIONAL STATUS - GENERAL
HELP NEEDED FOR BATHING: A LOT
TOILETING: A LOT
DRESSING REGULAR LOWER BODY CLOTHING: A LOT
DAILY ACTIVITIY SCORE: 15
SUGGESTED CMS G CODE MODIFIER DAILY ACTIVITY: CK
PERSONAL GROOMING: A LITTLE
DRESSING REGULAR UPPER BODY CLOTHING: A LOT

## 2022-10-27 ASSESSMENT — ENCOUNTER SYMPTOMS
DOUBLE VISION: 0
SENSORY CHANGE: 1
NECK PAIN: 1
FEVER: 0
HEADACHES: 0
SPEECH CHANGE: 0
VOMITING: 0
DIZZINESS: 0
BACK PAIN: 1
MYALGIAS: 1
CONSTIPATION: 0
SHORTNESS OF BREATH: 0
CHILLS: 0
NAUSEA: 0
FOCAL WEAKNESS: 0
ROS GI COMMENTS: BM 10/26
ABDOMINAL PAIN: 0
BLURRED VISION: 0

## 2022-10-27 ASSESSMENT — PAIN DESCRIPTION - PAIN TYPE
TYPE: ACUTE PAIN

## 2022-10-27 NOTE — DISCHARGE PLANNING
Agency/Facility Name: [facility]  Spoke To: Yas  Outcome: DPA called to check on bed availability, DPA was told Pts insurance needs auth. Facility to start insurance auth process today and will call DPA when insurance auth gets approved     RN CM notified
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
## 2022-10-27 NOTE — CARE PLAN
The patient is Stable - Low risk of patient condition declining or worsening    Shift Goals  Clinical Goals: pain management  Patient Goals: pain management  Family Goals: natividad    Progress made toward(s) clinical / shift goals:      Problem: Pain - Standard  Goal: Alleviation of pain or a reduction in pain to the patient’s comfort goal  Outcome: Progressing     Problem: Skin Integrity  Goal: Skin integrity is maintained or improved  Outcome: Progressing     Problem: Neuro Status  Goal: Neuro status will remain stable or improve  Outcome: Progressing       Patient is not progressing towards the following goals:       unable to assess

## 2022-10-27 NOTE — THERAPY
Occupational Therapy  Daily Treatment     Patient Name: Terence Mckeon Jr.  Age:  72 y.o., Sex:  male  Medical Record #: [id number]  Today's Date: 10/27/2022       Precautions: Fall Risk, Spinal / Back Precautions , TLSO (Thoracolumbosacral orthosis), Cervical Collar  , Swallow Precautions ( See Comments)  Comments: prior R BKA w/ prosthetic    Assessment    Pt seen for OT tx. Continues to be limited by decreased activity tolerance, balance deficits, inattention, pain and generalized weakness impacting ability to complete ADLs and ADL transfers independently. Min A supine > sit, required mod A to don TLSO, min A for prosthetic. Amb w/ FWW in room w/ min A for balance and safety. Will continue to benefit from OT services while in house.     Plan    Continue current treatment plan.    DC Equipment Recommendations: Unable to determine at this time  Discharge Recommendations: Recommend post-acute placement for additional occupational therapy services prior to discharge home       10/27/22 1115   Cognition    Cognition / Consciousness X   Comments limited by pain and pleasant   Active ROM Upper Body   Active ROM Upper Body  X   Comments able to complete full ROM   Strength Upper Body   Upper Body Strength  X   Gross Strength Generalized Weakness, Equal Bilaterally.    Balance   Sitting Balance (Static) Fair +   Sitting Balance (Dynamic) Fair   Standing Balance (Static) Fair   Standing Balance (Dynamic) Fair -   Weight Shift Sitting Good   Weight Shift Standing Fair   Bed Mobility    Supine to Sit Minimal Assist   Sit to Supine   (left up in chair)   Activities of Daily Living   Grooming Minimal Assist;Standing   Upper Body Dressing Moderate Assist  (TLSO)   Lower Body Dressing Maximal Assist   Toileting Maximal Assist   Functional Mobility   Sit to Stand Contact Guard Assist   Bed, Chair, Wheelchair Transfer Contact Guard Assist   Toilet Transfers Contact Guard Assist   Short Term Goals   Short Term Goal # 1 Pt  will complete ADL transfers with supv   Goal Outcome # 1 Progressing as expected   Short Term Goal # 2 Pt will complete LB dressing with CGA   Goal Outcome # 2 Goal not met   Short Term Goal # 3 Pt will complete standing grooming with CGA   Goal Outcome # 3 Progressing as expected   Anticipated Discharge Equipment and Recommendations   DC Equipment Recommendations Unable to determine at this time   Discharge Recommendations Recommend post-acute placement for additional occupational therapy services prior to discharge home

## 2022-10-27 NOTE — CARE PLAN
The patient is Stable - Low risk of patient condition declining or worsening    Shift Goals  Clinical Goals: Ambulate, self-feeds, pain control  Patient Goals: Pain management  Family Goals: BIJU    Progress made toward(s) clinical / shift goals:    Problem: Skin Integrity  Goal: Skin integrity is maintained or improved  Outcome: Progressing     Problem: Neuro Status  Goal: Neuro status will remain stable or improve  Outcome: Progressing       Patient is not progressing towards the following goals:

## 2022-10-28 PROCEDURE — 99231 SBSQ HOSP IP/OBS SF/LOW 25: CPT | Performed by: NURSE PRACTITIONER

## 2022-10-28 PROCEDURE — A9270 NON-COVERED ITEM OR SERVICE: HCPCS | Performed by: SURGERY

## 2022-10-28 PROCEDURE — 700102 HCHG RX REV CODE 250 W/ 637 OVERRIDE(OP): Performed by: SURGERY

## 2022-10-28 PROCEDURE — 97530 THERAPEUTIC ACTIVITIES: CPT

## 2022-10-28 PROCEDURE — 700102 HCHG RX REV CODE 250 W/ 637 OVERRIDE(OP): Performed by: NURSE PRACTITIONER

## 2022-10-28 PROCEDURE — 700111 HCHG RX REV CODE 636 W/ 250 OVERRIDE (IP): Performed by: PHYSICIAN ASSISTANT

## 2022-10-28 PROCEDURE — A9270 NON-COVERED ITEM OR SERVICE: HCPCS | Performed by: NURSE PRACTITIONER

## 2022-10-28 PROCEDURE — 92526 ORAL FUNCTION THERAPY: CPT

## 2022-10-28 PROCEDURE — 770001 HCHG ROOM/CARE - MED/SURG/GYN PRIV*

## 2022-10-28 RX ADMIN — OXYCODONE HYDROCHLORIDE 10 MG: 10 TABLET ORAL at 11:12

## 2022-10-28 RX ADMIN — POLYETHYLENE GLYCOL 3350 1 PACKET: 17 POWDER, FOR SOLUTION ORAL at 04:59

## 2022-10-28 RX ADMIN — GABAPENTIN 200 MG: 100 CAPSULE ORAL at 20:30

## 2022-10-28 RX ADMIN — GABAPENTIN 200 MG: 100 CAPSULE ORAL at 04:59

## 2022-10-28 RX ADMIN — MAGNESIUM HYDROXIDE 30 ML: 400 SUSPENSION ORAL at 04:59

## 2022-10-28 RX ADMIN — DOCUSATE SODIUM 100 MG: 50 LIQUID ORAL at 04:59

## 2022-10-28 RX ADMIN — OXYCODONE HYDROCHLORIDE 10 MG: 10 TABLET ORAL at 17:00

## 2022-10-28 RX ADMIN — METAXALONE 400 MG: 800 TABLET ORAL at 04:59

## 2022-10-28 RX ADMIN — GABAPENTIN 200 MG: 100 CAPSULE ORAL at 14:15

## 2022-10-28 RX ADMIN — ENOXAPARIN SODIUM 30 MG: 30 INJECTION SUBCUTANEOUS at 16:59

## 2022-10-28 RX ADMIN — ENOXAPARIN SODIUM 30 MG: 30 INJECTION SUBCUTANEOUS at 05:00

## 2022-10-28 RX ADMIN — METAXALONE 400 MG: 800 TABLET ORAL at 16:59

## 2022-10-28 RX ADMIN — METAXALONE 400 MG: 800 TABLET ORAL at 11:12

## 2022-10-28 RX ADMIN — AMLODIPINE BESYLATE 10 MG: 10 TABLET ORAL at 04:59

## 2022-10-28 RX ADMIN — OXYCODONE 5 MG: 5 TABLET ORAL at 03:48

## 2022-10-28 RX ADMIN — OXYCODONE HYDROCHLORIDE 10 MG: 10 TABLET ORAL at 20:31

## 2022-10-28 RX ADMIN — OMEPRAZOLE 40 MG: KIT at 05:00

## 2022-10-28 ASSESSMENT — COGNITIVE AND FUNCTIONAL STATUS - GENERAL
STANDING UP FROM CHAIR USING ARMS: A LITTLE
MOVING FROM LYING ON BACK TO SITTING ON SIDE OF FLAT BED: A LOT
MOBILITY SCORE: 13
TURNING FROM BACK TO SIDE WHILE IN FLAT BAD: A LOT
WALKING IN HOSPITAL ROOM: A LITTLE
MOVING TO AND FROM BED TO CHAIR: UNABLE
SUGGESTED CMS G CODE MODIFIER MOBILITY: CL
CLIMB 3 TO 5 STEPS WITH RAILING: A LOT

## 2022-10-28 ASSESSMENT — GAIT ASSESSMENTS
GAIT LEVEL OF ASSIST: CONTACT GUARD ASSIST
ASSISTIVE DEVICE: FRONT WHEEL WALKER
DEVIATION: STEP TO
DISTANCE (FEET): 40

## 2022-10-28 ASSESSMENT — PAIN DESCRIPTION - PAIN TYPE
TYPE: ACUTE PAIN
TYPE: ACUTE PAIN

## 2022-10-28 NOTE — THERAPY
"Physical Therapy   Daily Treatment     Patient Name: Terence Mckeon Jr.  Age:  72 y.o., Sex:  male  Medical Record #: [id number]  Today's Date: 10/28/2022     Precautions  Precautions: Fall Risk;Spinal / Back Precautions ;TLSO (Thoracolumbosacral orthosis);Cervical Collar  ;Swallow Precautions ( See Comments)  Comments: prior R BKA with prosthetic    Assessment    Patient progressing with functional mobility. He required decreased assist and tolerated increased activity as compared to prior. He continues to report back and generalized pain and cited pain as reason to decline OOB activity until reminded by therapist that he was medicated prior to session. Once OOB he attempted more activity than originally agreed to. He required encouragement to attempt donning/doffing brace without assist and required min A for brace and prosthesis. Will continue to follow.    Plan    Continue current treatment plan.    DC Equipment Recommendations: Unable to determine at this time  Discharge Recommendations: Recommend post-acute placement for additional physical therapy services prior to discharge home      Subjective    \"I don't remember getting my meds.\"     Objective       10/28/22 1212   Charge Group   Charges  Yes   PT Therapeutic Activities 2  (28 min)   Precautions   Precautions Fall Risk;Spinal / Back Precautions ;TLSO (Thoracolumbosacral orthosis);Cervical Collar  ;Swallow Precautions ( See Comments)   Comments prior R BKA with prosthetic   Vitals   O2 (LPM) 0   O2 Delivery Device None - Room Air   Pain 0 - 10 Group   Location Back   Therapist Pain Assessment During Activity;Nurse Notified   Cognition    Speech/ Communication Hard of Hearing   Level of Consciousness Alert   Comments question STM deficits; was medicated prior to session but did not recall receiving meds. cooperative with encouragement   Balance   Sitting Balance (Static) Fair +   Sitting Balance (Dynamic) Fair   Standing Balance (Static) Fair "   Standing Balance (Dynamic) Fair   Weight Shift Sitting Good   Weight Shift Standing Fair   Skilled Intervention Verbal Cuing;Compensatory Strategies;Tactile Cuing   Comments with FWW in standing, no overt LOB   Gait Analysis   Gait Level Of Assist Contact Guard Assist   Assistive Device Front Wheel Walker   Distance (Feet) 40   # of Times Distance was Traveled 1   Deviation Step To  (decreased dawit, step length)   # of Stairs Climbed 0   Weight Bearing Status no restrictions   Vision Deficits Impacting Mobility NT   Skilled Intervention Verbal Cuing;Compensatory Strategies;Tactile Cuing   Bed Mobility    Supine to Sit Minimal Assist  (pulled on therapist to move sidelying to sitting EOB)   Sit to Supine Supervised   Scooting Supervised   Rolling Supervised   Skilled Intervention Verbal Cuing;Compensatory Strategies   Functional Mobility   Sit to Stand Contact Guard Assist   Bed, Chair, Wheelchair Transfer Contact Guard Assist   Toilet Transfers Contact Guard Assist   Transfer Method Stand Step   Skilled Intervention Verbal Cuing;Compensatory Strategies   How much difficulty does the patient currently have...   Turning over in bed (including adjusting bedclothes, sheets and blankets)? 2   Sitting down on and standing up from a chair with arms (e.g., wheelchair, bedside commode, etc.) 2   Moving from lying on back to sitting on the side of the bed? 1   How much help from another person does the patient currently need...   Moving to and from a bed to a chair (including a wheelchair)? 3   Need to walk in a hospital room? 3   Climbing 3-5 steps with a railing? 2   6 clicks Mobility Score 13   Activity Tolerance   Sitting in Chair declined; 5-6 min on toilet   Sitting Edge of Bed 5-6 min   Standing 6-7 min   Comments limited by fatigue   Short Term Goals    Short Term Goal # 1 Pt will perform bed mobility via log roll with supervision to progress function in 6 visits.   Goal Outcome # 1 Progressing as expected    Short Term Goal # 2 Pt will perform functional transfers with supervision and FWW to progress mobility in 6 visits.   Goal Outcome # 2 Progressing as expected   Short Term Goal # 3 Pt will ambulate 100ft with FWW and sueprvision to progress mobility in 6 visits.   Goal Outcome # 3 Goal not met   Anticipated Discharge Equipment and Recommendations   DC Equipment Recommendations Unable to determine at this time   Discharge Recommendations Recommend post-acute placement for additional physical therapy services prior to discharge home   Interdisciplinary Plan of Care Collaboration   IDT Collaboration with  Nursing   Patient Position at End of Therapy In Bed;Bed Alarm On;Call Light within Reach;Tray Table within Reach;Phone within Reach   Collaboration Comments RN aware of visit, response   Session Information   Date / Session Number  10/28-4 (1/4, 11/2)

## 2022-10-28 NOTE — DISCHARGE PLANNING
Agency/Facility Name: [facility]  Spoke To: Yas  Outcome: DPA called to verify if Pt insurance auth came through yet. It is still in pending status as of today     RN ROD notified
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
## 2022-10-28 NOTE — CARE PLAN
The patient is Stable - Low risk of patient condition declining or worsening    Shift Goals  Clinical Goals: (P) Ambulate, monitor self feeds  Patient Goals: (P) Change NPO status  Family Goals: BIJU    Progress made toward(s) clinical / shift goals:    Problem: Knowledge Deficit - Standard  Goal: Patient and family/care givers will demonstrate understanding of plan of care, disease process/condition, diagnostic tests and medications  Outcome: Progressing     Problem: Pain - Standard  Goal: Alleviation of pain or a reduction in pain to the patient’s comfort goal  Outcome: Progressing     Problem: Skin Integrity  Goal: Skin integrity is maintained or improved  Outcome: Progressing     Problem: Fall Risk  Goal: Patient will remain free from falls  Outcome: Progressing     Problem: Neuro Status  Goal: Neuro status will remain stable or improve  Outcome: Progressing       Patient is not progressing towards the following goals:
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
## 2022-10-28 NOTE — DISCHARGE INSTR - SLP
Swallowing Exercises  A handout for patients with swallowing impairment    Dysphagia is an impairment in swallowing that can occur for a variety of reasons. Swallowing muscles can become weak from illness, injury, disease, or simply not eating, which may result in dysphagia. Just like any other muscle in the body, swallowing muscles can be strengthened with exercises.      Radiation to the head and neck can lead to long-term swallowing problems called dysphagia. Patients with dysphagia have a hard time swallowing food, liquid or saliva. Swallowing problems can occur during treatment, or may develop or continue long after radiation has ended. These exercises may help reduce the potential for long-term swallowing problems and improve swallowing function. It is important to continue these exercises after completion of radiation, as difficulty may increase as long as eight years after radiation. Ask your speech pathologist if you have questions about these exercises.    Goal: Do a set (10 repetitions) of each exercise 3 times a day. It should take about 10 minutes  to complete all the exercises. Take short breaks or sips of water during any given set if needed.    General Anatomy and Physiology of Swallowing:  There are two tracts in the throat: the airway (trachea) and the food pipe (esophagus). During swallowing, there are several factors that prevent food or liquid from entering the airway. First the vocal cords located at the top of the airway close together, second the voice box (larynx) elevates and closes the entrance to the airway, and third the epiglottis folds over the entrance to the airway. Swallowing muscles are responsible for each of these factors. Additionally, muscles work to move food and liquid through the throat and into the food pipe.   Complete the following exercises marked by your speech pathologist. You can complete these exercises by doing a “dry” swallow or swallowing your saliva. Take breaks  or sips of water between sets as needed. You may feel fatigue or difficulty completing these exercises, but you should not feel pain.     Floor of Mouth Exercise:  The floor of mouth muscles move the voice box upwards and outwards to close the entrance to the airway and keep food and liquid from entering the lungs during swallowing. These exercises aim to increase strength.     Mendelsohn Exercise - Repeat Step 3, 10 times ___ times per day  Step 1: Place your fingers over the bulge at the front of your throat; this is sometimes called the Sami’s apple.   Step 2: Swallow, and as you do feel the Sami’s apple lift and lower.  Step 3: Swallow again, and as you do squeeze your throat muscles to hold the Sami’s apple up for three seconds    Airway Protection Exercise:  As you swallow, it is important for the vocal cords to close tightly together to keep food and liquid from entering the lungs. This exercise aims to help the vocal cords close during swallowing.     Supraglottic Swallow - Repeat Steps 1, 2, and 3, 10 times ___ times per day  Step 1: Prepare by breathing in through your nose. Then hold your breath tightly.  Step 2: Keeping your breath held tight, swallow your saliva once.  Step 3: Release your breath with a sharp cough or throat clear. Then swallow again.    Base of Tongue and Throat Exercises:  The base of the tongue is a large muscle that functions as the “pump” in the swallowing system. It works with the throat muscles to push food through the throat and into the food pipe. These exercises aim to strengthen the base of the tongue and throat muscles.     Effortful Swallow - Repeat Steps 1, 2, and 3, 10 times ___ times per day  Step 1: Close your lips tight and push as hard as you can with the tongue against the roof of your mouth.  Step 2: While holding your tongue in that position, swallow your saliva as hard as you can, squeezing all the muscles in your throat.  Step 3: After your swallow;  relax.    Jaylene Exercise - Repeat Steps 1, 2, and 3, 10 times ___ times per day  Step 1: Stick out your tongue and hold it gently between your lips or teeth.  Step 2: Holding your tongue in this position, try to swallow your saliva.  Step 3: After you swallow, relax.    Recommendations  1.  Soft and bite sized solids with thin liquids despite risk  2. Swallowing Instructions & Precautions:   Positioning: Fully upright and midline during oral intake; Remain upright 30 minutes after eating/drinking (reflux precautions)  Medication: Non Oral   Oral Care: Prior to eating/drinking  3.  Following for dysphagia management with a focus on mitigation of aspiration PNA and HNC exercise training

## 2022-10-28 NOTE — DISCHARGE PLANNING
Case Management Discharge Planning    Admission Date: 10/17/2022  GMLOS: 7.3  ALOS: 11    6-Clicks ADL Score: 15  6-Clicks Mobility Score: 13  PT and/or OT Eval ordered: yes  Post-acute Referrals Ordered: yes  Post-acute Choice Obtained: yes  Has referral(s) been sent to post-acute provider:  yes      Anticipated Discharge Dispo: Discharge Disposition: D/T to SNF with Medicare cert in anticipation of skilled care (03)  [facility] accepted but insurance is pending.    DME Needed: none    Action(s) Taken: Discussed with IDT, insurance auth for [facility] is pending.     PASRR:[id number]    Escalations Completed: none    Medically Clear: yes    Next Steps: follow up with [facility]    Barriers to Discharge: pending insurance auth    Is the patient up for discharge tomorrow: hopefully.

## 2022-10-28 NOTE — CARE PLAN
The patient is Stable - Low risk of patient condition declining or worsening    Shift Goals  Clinical Goals: Ambulate, self-feeds, pain control  Patient Goals: Pain management  Family Goals: BIJU    Progress made toward(s) clinical / shift goals:      Problem: Pain - Standard  Goal: Alleviation of pain or a reduction in pain to the patient’s comfort goal  Outcome: Progressing     Problem: Skin Integrity  Goal: Skin integrity is maintained or improved  Outcome: Progressing     Problem: Fall Risk  Goal: Patient will remain free from falls  Outcome: Progressing       Patient is not progressing towards the following goals:

## 2022-10-29 PROCEDURE — A9270 NON-COVERED ITEM OR SERVICE: HCPCS | Performed by: SURGERY

## 2022-10-29 PROCEDURE — 700102 HCHG RX REV CODE 250 W/ 637 OVERRIDE(OP): Performed by: SURGERY

## 2022-10-29 PROCEDURE — 770001 HCHG ROOM/CARE - MED/SURG/GYN PRIV*

## 2022-10-29 PROCEDURE — 700111 HCHG RX REV CODE 636 W/ 250 OVERRIDE (IP): Performed by: PHYSICIAN ASSISTANT

## 2022-10-29 PROCEDURE — 99232 SBSQ HOSP IP/OBS MODERATE 35: CPT | Performed by: NURSE PRACTITIONER

## 2022-10-29 PROCEDURE — A9270 NON-COVERED ITEM OR SERVICE: HCPCS | Performed by: NURSE PRACTITIONER

## 2022-10-29 PROCEDURE — 700102 HCHG RX REV CODE 250 W/ 637 OVERRIDE(OP): Performed by: NURSE PRACTITIONER

## 2022-10-29 RX ADMIN — ENOXAPARIN SODIUM 30 MG: 30 INJECTION SUBCUTANEOUS at 05:09

## 2022-10-29 RX ADMIN — GABAPENTIN 200 MG: 100 CAPSULE ORAL at 05:10

## 2022-10-29 RX ADMIN — OMEPRAZOLE 40 MG: KIT at 05:09

## 2022-10-29 RX ADMIN — SENNOSIDES AND DOCUSATE SODIUM 1 TABLET: 50; 8.6 TABLET ORAL at 20:24

## 2022-10-29 RX ADMIN — DOCUSATE SODIUM 100 MG: 50 LIQUID ORAL at 17:18

## 2022-10-29 RX ADMIN — GABAPENTIN 200 MG: 100 CAPSULE ORAL at 13:12

## 2022-10-29 RX ADMIN — GABAPENTIN 200 MG: 100 CAPSULE ORAL at 20:24

## 2022-10-29 RX ADMIN — ENOXAPARIN SODIUM 30 MG: 30 INJECTION SUBCUTANEOUS at 17:19

## 2022-10-29 RX ADMIN — METAXALONE 400 MG: 800 TABLET ORAL at 12:31

## 2022-10-29 RX ADMIN — OXYCODONE HYDROCHLORIDE 10 MG: 10 TABLET ORAL at 02:41

## 2022-10-29 RX ADMIN — METAXALONE 400 MG: 800 TABLET ORAL at 17:19

## 2022-10-29 RX ADMIN — BACITRACIN ZINC: 500 OINTMENT TOPICAL at 10:00

## 2022-10-29 RX ADMIN — OXYCODONE HYDROCHLORIDE 10 MG: 10 TABLET ORAL at 12:29

## 2022-10-29 RX ADMIN — OXYCODONE HYDROCHLORIDE 10 MG: 10 TABLET ORAL at 08:19

## 2022-10-29 RX ADMIN — METAXALONE 400 MG: 800 TABLET ORAL at 05:09

## 2022-10-29 RX ADMIN — POLYETHYLENE GLYCOL 3350 1 PACKET: 17 POWDER, FOR SOLUTION ORAL at 17:19

## 2022-10-29 RX ADMIN — OXYCODONE HYDROCHLORIDE 10 MG: 10 TABLET ORAL at 15:35

## 2022-10-29 RX ADMIN — AMLODIPINE BESYLATE 10 MG: 10 TABLET ORAL at 05:09

## 2022-10-29 RX ADMIN — OXYCODONE HYDROCHLORIDE 10 MG: 10 TABLET ORAL at 20:24

## 2022-10-29 ASSESSMENT — ENCOUNTER SYMPTOMS
BLURRED VISION: 0
SENSORY CHANGE: 1
VOMITING: 0
SHORTNESS OF BREATH: 0
SPEECH CHANGE: 0
CHILLS: 0
NAUSEA: 0
ABDOMINAL PAIN: 0
HEADACHES: 0
FEVER: 0
DIZZINESS: 0
DOUBLE VISION: 0
FOCAL WEAKNESS: 0
CONSTIPATION: 0

## 2022-10-29 ASSESSMENT — PAIN DESCRIPTION - PAIN TYPE
TYPE: ACUTE PAIN;CHRONIC PAIN
TYPE: ACUTE PAIN
TYPE: ACUTE PAIN;SURGICAL PAIN
TYPE: ACUTE PAIN;CHRONIC PAIN
TYPE: CHRONIC PAIN

## 2022-10-29 ASSESSMENT — FIBROSIS 4 INDEX: FIB4 SCORE: 13.66

## 2022-10-29 NOTE — DISCHARGE PLANNING
Agency/Facility Name: [facility]  Spoke To: Yas  Outcome: Per Yas, insurance auth was approved, but they won't be able to admit the pt until Monday due to the pt's wound needs.  RN ROD notified

## 2022-10-29 NOTE — PROGRESS NOTES
Patient found to have c-collar off when in bed sitting up. Patient was educated on the orders from the MD and why it is important to have it on. Patient states he is not going to listen and doesn't want it on all the time I have independently seen and examined the patient. I attest to and agree with the mid-level provider's note, including assessment, plan, and physical exam. Any exceptions or additions are noted below made aware.

## 2022-10-29 NOTE — CARE PLAN
The patient is Watcher - Medium risk of patient condition declining or worsening    Shift Goals  Clinical Goals: participate in ADL  Patient Goals: sleep  Family Goals: BIJU    Progress made toward(s) clinical / shift goals:  able to sleep  Problem: Knowledge Deficit - Standard  Goal: Patient and family/care givers will demonstrate understanding of plan of care, disease process/condition, diagnostic tests and medications  Outcome: Progressing     Problem: Pain - Standard  Goal: Alleviation of pain or a reduction in pain to the patient’s comfort goal  Outcome: Progressing     Problem: Skin Integrity  Goal: Skin integrity is maintained or improved  Outcome: Progressing     Problem: Fall Risk  Goal: Patient will remain free from falls  Outcome: Progressing     Problem: Neuro Status  Goal: Neuro status will remain stable or improve  Outcome: Progressing       Patient is not progressing towards the following goals:

## 2022-10-29 NOTE — CARE PLAN
The patient is Stable - Low risk of patient condition declining or worsening    Shift Goals  Clinical Goals: decrease pain, get out of bed  Patient Goals: decrease pain, sleep comfortably, no c collar  Family Goals: n/a    Progress made toward(s) clinical / shift goals:  Patient is aox4. Pain has been better controlled with oxy 10 and on longer Iv pain meds needed. Patient moves around in bed without assistance.     Patient is not progressing towards the following goals: patient is refusing to keep c-collar on . Patient was educated on why it is important to have it on at all times, as per MD orders. Patient is hard of hearing and doesn't have hearing aids. The back of left leg has wound and open to air. Offloading q2hr.
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
## 2022-10-29 NOTE — PROGRESS NOTES
Palliative Care   Patient sitting up in bed attempting to eat breakfast.  He is hard of hearing.  Again offered to assist with completion of advance directive and POLST form.  He reports he has a lot planned today including dressing changes and would prefer to defer to a later day.  Hospital notary available Monday 10/31.  Patient has been accepted at [facility] however pending insurance authorization.  Discussed that I will follow-up with him prior to discharge if possible to complete documents.    Risa Young, LETA.P.R.N.  Palliative Care Nurse Practitioner  [phone]

## 2022-10-30 PROCEDURE — 700102 HCHG RX REV CODE 250 W/ 637 OVERRIDE(OP): Performed by: NURSE PRACTITIONER

## 2022-10-30 PROCEDURE — 770001 HCHG ROOM/CARE - MED/SURG/GYN PRIV*

## 2022-10-30 PROCEDURE — 700102 HCHG RX REV CODE 250 W/ 637 OVERRIDE(OP): Performed by: SURGERY

## 2022-10-30 PROCEDURE — A9270 NON-COVERED ITEM OR SERVICE: HCPCS | Performed by: SURGERY

## 2022-10-30 PROCEDURE — 700111 HCHG RX REV CODE 636 W/ 250 OVERRIDE (IP): Performed by: PHYSICIAN ASSISTANT

## 2022-10-30 PROCEDURE — 99232 SBSQ HOSP IP/OBS MODERATE 35: CPT | Performed by: NURSE PRACTITIONER

## 2022-10-30 PROCEDURE — A9270 NON-COVERED ITEM OR SERVICE: HCPCS | Performed by: NURSE PRACTITIONER

## 2022-10-30 RX ADMIN — OXYCODONE HYDROCHLORIDE 10 MG: 10 TABLET ORAL at 02:31

## 2022-10-30 RX ADMIN — GABAPENTIN 200 MG: 100 CAPSULE ORAL at 06:13

## 2022-10-30 RX ADMIN — OXYCODONE HYDROCHLORIDE 10 MG: 10 TABLET ORAL at 12:51

## 2022-10-30 RX ADMIN — METAXALONE 400 MG: 800 TABLET ORAL at 12:50

## 2022-10-30 RX ADMIN — POLYETHYLENE GLYCOL 3350 1 PACKET: 17 POWDER, FOR SOLUTION ORAL at 06:12

## 2022-10-30 RX ADMIN — BACITRACIN ZINC: 500 OINTMENT TOPICAL at 12:53

## 2022-10-30 RX ADMIN — ENOXAPARIN SODIUM 30 MG: 30 INJECTION SUBCUTANEOUS at 06:13

## 2022-10-30 RX ADMIN — OXYCODONE HYDROCHLORIDE 10 MG: 10 TABLET ORAL at 08:45

## 2022-10-30 RX ADMIN — OXYCODONE HYDROCHLORIDE 10 MG: 10 TABLET ORAL at 20:31

## 2022-10-30 RX ADMIN — AMLODIPINE BESYLATE 10 MG: 10 TABLET ORAL at 06:13

## 2022-10-30 RX ADMIN — OXYCODONE HYDROCHLORIDE 10 MG: 10 TABLET ORAL at 16:50

## 2022-10-30 RX ADMIN — METAXALONE 400 MG: 800 TABLET ORAL at 06:13

## 2022-10-30 RX ADMIN — GABAPENTIN 200 MG: 100 CAPSULE ORAL at 20:32

## 2022-10-30 RX ADMIN — MAGNESIUM HYDROXIDE 30 ML: 400 SUSPENSION ORAL at 06:14

## 2022-10-30 RX ADMIN — ENOXAPARIN SODIUM 30 MG: 30 INJECTION SUBCUTANEOUS at 18:49

## 2022-10-30 RX ADMIN — DOCUSATE SODIUM 100 MG: 50 LIQUID ORAL at 06:13

## 2022-10-30 RX ADMIN — GABAPENTIN 200 MG: 100 CAPSULE ORAL at 12:53

## 2022-10-30 RX ADMIN — METAXALONE 400 MG: 800 TABLET ORAL at 18:50

## 2022-10-30 RX ADMIN — OMEPRAZOLE 40 MG: KIT at 06:12

## 2022-10-30 ASSESSMENT — ENCOUNTER SYMPTOMS
ABDOMINAL PAIN: 0
CONSTIPATION: 0
DIZZINESS: 0
SHORTNESS OF BREATH: 0
BLURRED VISION: 0
SPEECH CHANGE: 0
HEADACHES: 0
SENSORY CHANGE: 1
CHILLS: 0
FEVER: 0
DOUBLE VISION: 0
NAUSEA: 0
FOCAL WEAKNESS: 0
VOMITING: 0

## 2022-10-30 ASSESSMENT — PAIN DESCRIPTION - PAIN TYPE
TYPE: CHRONIC PAIN;SURGICAL PAIN
TYPE: ACUTE PAIN;CHRONIC PAIN
TYPE: ACUTE PAIN;CHRONIC PAIN
TYPE: CHRONIC PAIN;SURGICAL PAIN
TYPE: CHRONIC PAIN;SURGICAL PAIN
TYPE: ACUTE PAIN;CHRONIC PAIN

## 2022-10-30 NOTE — CARE PLAN
The patient is Watcher - Medium risk of patient condition declining or worsening    Shift Goals  Clinical Goals: pain management,getting OOB  Patient Goals: sleep  Family Goals: n/a    Progress made toward(s) clinical / shift goals:  able sleep  Problem: Knowledge Deficit - Standard  Goal: Patient and family/care givers will demonstrate understanding of plan of care, disease process/condition, diagnostic tests and medications  Outcome: Progressing     Problem: Pain - Standard  Goal: Alleviation of pain or a reduction in pain to the patient’s comfort goal  Outcome: Progressing     Problem: Skin Integrity  Goal: Skin integrity is maintained or improved  Outcome: Progressing     Problem: Fall Risk  Goal: Patient will remain free from falls  Outcome: Progressing       Patient is not progressing towards the following goals:
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
## 2022-10-31 ENCOUNTER — APPOINTMENT (OUTPATIENT)
Dept: RADIOLOGY | Facility: MEDICAL CENTER | Age: 72
DRG: 028 | End: 2022-10-31
Attending: NURSE PRACTITIONER
Payer: OTHER MISCELLANEOUS

## 2022-10-31 LAB
ALBUMIN SERPL BCP-MCNC: 2.9 G/DL (ref 3.2–4.9)
ALBUMIN/GLOB SERPL: 1 G/DL
ALP SERPL-CCNC: 240 U/L (ref 30–99)
ALT SERPL-CCNC: 24 U/L (ref 2–50)
ANION GAP SERPL CALC-SCNC: 7 MMOL/L (ref 7–16)
ANISOCYTOSIS BLD QL SMEAR: ABNORMAL
AST SERPL-CCNC: 38 U/L (ref 12–45)
BASOPHILS # BLD AUTO: 1.7 % (ref 0–1.8)
BASOPHILS # BLD: 0.06 K/UL (ref 0–0.12)
BILIRUB SERPL-MCNC: 1.1 MG/DL (ref 0.1–1.5)
BUN SERPL-MCNC: 33 MG/DL (ref 8–22)
CALCIUM SERPL-MCNC: 8.1 MG/DL (ref 8.5–10.5)
CHLORIDE SERPL-SCNC: 111 MMOL/L (ref 96–112)
CO2 SERPL-SCNC: 24 MMOL/L (ref 20–33)
CREAT SERPL-MCNC: 0.85 MG/DL (ref 0.5–1.4)
EOSINOPHIL # BLD AUTO: 0.06 K/UL (ref 0–0.51)
EOSINOPHIL NFR BLD: 1.7 % (ref 0–6.9)
ERYTHROCYTE [DISTWIDTH] IN BLOOD BY AUTOMATED COUNT: 65.6 FL (ref 35.9–50)
GFR SERPLBLD CREATININE-BSD FMLA CKD-EPI: 92 ML/MIN/1.73 M 2
GLOBULIN SER CALC-MCNC: 3 G/DL (ref 1.9–3.5)
GLUCOSE SERPL-MCNC: 99 MG/DL (ref 65–99)
HCT VFR BLD AUTO: 28.8 % (ref 42–52)
HGB BLD-MCNC: 8.8 G/DL (ref 14–18)
LYMPHOCYTES # BLD AUTO: 0.36 K/UL (ref 1–4.8)
LYMPHOCYTES NFR BLD: 9.4 % (ref 22–41)
MACROCYTES BLD QL SMEAR: ABNORMAL
MAGNESIUM SERPL-MCNC: 2.3 MG/DL (ref 1.5–2.5)
MANUAL DIFF BLD: NORMAL
MCH RBC QN AUTO: 30.7 PG (ref 27–33)
MCHC RBC AUTO-ENTMCNC: 30.6 G/DL (ref 33.7–35.3)
MCV RBC AUTO: 100.3 FL (ref 81.4–97.8)
METAMYELOCYTES NFR BLD MANUAL: 0.9 %
MICROCYTES BLD QL SMEAR: ABNORMAL
MONOCYTES # BLD AUTO: 0.16 K/UL (ref 0–0.85)
MONOCYTES NFR BLD AUTO: 4.3 % (ref 0–13.4)
MORPHOLOGY BLD-IMP: NORMAL
NEUTROPHILS # BLD AUTO: 3.12 K/UL (ref 1.82–7.42)
NEUTROPHILS NFR BLD: 82 % (ref 44–72)
NRBC # BLD AUTO: 0 K/UL
NRBC BLD-RTO: 0 /100 WBC
OVALOCYTES BLD QL SMEAR: NORMAL
PHOSPHATE SERPL-MCNC: 3.6 MG/DL (ref 2.5–4.5)
PLATELET # BLD AUTO: 107 K/UL (ref 164–446)
PLATELET BLD QL SMEAR: NORMAL
PMV BLD AUTO: 12.5 FL (ref 9–12.9)
POIKILOCYTOSIS BLD QL SMEAR: NORMAL
POLYCHROMASIA BLD QL SMEAR: NORMAL
POTASSIUM SERPL-SCNC: 4.3 MMOL/L (ref 3.6–5.5)
PROT SERPL-MCNC: 5.9 G/DL (ref 6–8.2)
RBC # BLD AUTO: 2.87 M/UL (ref 4.7–6.1)
RBC BLD AUTO: PRESENT
SARS-COV+SARS-COV-2 AG RESP QL IA.RAPID: NOTDETECTED
SODIUM SERPL-SCNC: 142 MMOL/L (ref 135–145)
SPECIMEN SOURCE: NORMAL
STOMATOCYTES BLD QL SMEAR: NORMAL
WBC # BLD AUTO: 3.8 K/UL (ref 4.8–10.8)

## 2022-10-31 PROCEDURE — 80053 COMPREHEN METABOLIC PANEL: CPT

## 2022-10-31 PROCEDURE — 700102 HCHG RX REV CODE 250 W/ 637 OVERRIDE(OP): Performed by: SURGERY

## 2022-10-31 PROCEDURE — 99497 ADVNCD CARE PLAN 30 MIN: CPT | Performed by: NURSE PRACTITIONER

## 2022-10-31 PROCEDURE — 87426 SARSCOV CORONAVIRUS AG IA: CPT

## 2022-10-31 PROCEDURE — A9270 NON-COVERED ITEM OR SERVICE: HCPCS | Performed by: SURGERY

## 2022-10-31 PROCEDURE — 36415 COLL VENOUS BLD VENIPUNCTURE: CPT

## 2022-10-31 PROCEDURE — 700111 HCHG RX REV CODE 636 W/ 250 OVERRIDE (IP): Performed by: PHYSICIAN ASSISTANT

## 2022-10-31 PROCEDURE — 700102 HCHG RX REV CODE 250 W/ 637 OVERRIDE(OP): Performed by: NURSE PRACTITIONER

## 2022-10-31 PROCEDURE — 83735 ASSAY OF MAGNESIUM: CPT

## 2022-10-31 PROCEDURE — 71045 X-RAY EXAM CHEST 1 VIEW: CPT

## 2022-10-31 PROCEDURE — 84100 ASSAY OF PHOSPHORUS: CPT

## 2022-10-31 PROCEDURE — A9270 NON-COVERED ITEM OR SERVICE: HCPCS | Performed by: NURSE PRACTITIONER

## 2022-10-31 PROCEDURE — 99498 ADVNCD CARE PLAN ADDL 30 MIN: CPT | Performed by: NURSE PRACTITIONER

## 2022-10-31 PROCEDURE — 99231 SBSQ HOSP IP/OBS SF/LOW 25: CPT | Performed by: NURSE PRACTITIONER

## 2022-10-31 PROCEDURE — 770001 HCHG ROOM/CARE - MED/SURG/GYN PRIV*

## 2022-10-31 PROCEDURE — 85025 COMPLETE CBC W/AUTO DIFF WBC: CPT

## 2022-10-31 PROCEDURE — 85007 BL SMEAR W/DIFF WBC COUNT: CPT

## 2022-10-31 RX ADMIN — METAXALONE 400 MG: 800 TABLET ORAL at 05:31

## 2022-10-31 RX ADMIN — DOCUSATE SODIUM 100 MG: 50 LIQUID ORAL at 18:31

## 2022-10-31 RX ADMIN — AMLODIPINE BESYLATE 10 MG: 10 TABLET ORAL at 05:31

## 2022-10-31 RX ADMIN — OXYCODONE HYDROCHLORIDE 10 MG: 10 TABLET ORAL at 18:31

## 2022-10-31 RX ADMIN — POLYETHYLENE GLYCOL 3350 1 PACKET: 17 POWDER, FOR SOLUTION ORAL at 18:31

## 2022-10-31 RX ADMIN — OMEPRAZOLE 40 MG: KIT at 05:32

## 2022-10-31 RX ADMIN — OXYCODONE HYDROCHLORIDE 10 MG: 10 TABLET ORAL at 08:07

## 2022-10-31 RX ADMIN — ENOXAPARIN SODIUM 30 MG: 30 INJECTION SUBCUTANEOUS at 05:31

## 2022-10-31 RX ADMIN — OXYCODONE HYDROCHLORIDE 10 MG: 10 TABLET ORAL at 21:59

## 2022-10-31 RX ADMIN — METAXALONE 400 MG: 800 TABLET ORAL at 18:31

## 2022-10-31 RX ADMIN — GABAPENTIN 200 MG: 100 CAPSULE ORAL at 15:03

## 2022-10-31 RX ADMIN — GABAPENTIN 200 MG: 100 CAPSULE ORAL at 05:31

## 2022-10-31 RX ADMIN — ENOXAPARIN SODIUM 30 MG: 30 INJECTION SUBCUTANEOUS at 18:31

## 2022-10-31 RX ADMIN — BACITRACIN ZINC: 500 OINTMENT TOPICAL at 11:38

## 2022-10-31 RX ADMIN — METAXALONE 400 MG: 800 TABLET ORAL at 11:38

## 2022-10-31 RX ADMIN — GABAPENTIN 200 MG: 100 CAPSULE ORAL at 21:59

## 2022-10-31 RX ADMIN — OXYCODONE HYDROCHLORIDE 10 MG: 10 TABLET ORAL at 01:20

## 2022-10-31 RX ADMIN — OXYCODONE HYDROCHLORIDE 10 MG: 10 TABLET ORAL at 11:38

## 2022-10-31 ASSESSMENT — ENCOUNTER SYMPTOMS
NAUSEA: 0
DIZZINESS: 0
BLURRED VISION: 0
SHORTNESS OF BREATH: 0
CHILLS: 0
FEVER: 0
DOUBLE VISION: 0
CONSTIPATION: 0
SENSORY CHANGE: 1
SPEECH CHANGE: 0
HEADACHES: 0
ABDOMINAL PAIN: 0
VOMITING: 0
FOCAL WEAKNESS: 0

## 2022-10-31 ASSESSMENT — PAIN DESCRIPTION - PAIN TYPE
TYPE: ACUTE PAIN;CHRONIC PAIN
TYPE: ACUTE PAIN
TYPE: ACUTE PAIN;CHRONIC PAIN
TYPE: ACUTE PAIN

## 2022-10-31 NOTE — CARE PLAN
The patient is Watcher - Medium risk of patient condition declining or worsening    Shift Goals  Clinical Goals: manage pain  Patient Goals: sleep  Family Goals: n/a    Progress made toward(s) clinical / shift goals:  able to sleep  Problem: Knowledge Deficit - Standard  Goal: Patient and family/care givers will demonstrate understanding of plan of care, disease process/condition, diagnostic tests and medications  Outcome: Progressing     Problem: Pain - Standard  Goal: Alleviation of pain or a reduction in pain to the patient’s comfort goal  Outcome: Progressing     Problem: Skin Integrity  Goal: Skin integrity is maintained or improved  Outcome: Progressing     Problem: Fall Risk  Goal: Patient will remain free from falls  Outcome: Progressing       Patient is not progressing towards the following goals:

## 2022-10-31 NOTE — DISCHARGE PLANNING
1634 Presented IMM's to pt. Read and verbalized understanding.  Pt signed, dated and signed 10/31/22 @ 2323.  Copy given to pt. And faxed original to Nancy BISWAS.

## 2022-10-31 NOTE — DISCHARGE PLANNING
DC Transport Scheduled    Received request at: 10/31/2022 1502    Transport Company Scheduled:  [employer]  Spoke with Kolby at [facility] to schedule transport.    Scheduled Date: 11/1/2022  Scheduled Time: 1300    Destination: [address]    Notified care team of scheduled transport via Vo[employer]te.     If there are any changes needed to the DC transportation scheduled, please contact [facility] at ext. [phone] between the hours of 5792-1303 Mon-Fri. If outside those hours, contact the ED Case Manager at ext. [phone].
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
## 2022-10-31 NOTE — DISCHARGE PLANNING
Agency/Facility Name: [facility]  Outcome: DPA left vmail requesting updated status on insurance authorization     RN CM notified     [id number]    Agency/Facility Name: [facility]  Spoke To: Corinne  Outcome: DPA was notified Pt has bed tomorrow at [facility]. RN CM to set up transport as gurney is needed and to notify [facility] with transport time    RN CM notified

## 2022-10-31 NOTE — THERAPY
"Missed Therapy     Patient Name: Terence Mckeon Jr.  Age:  72 y.o., Sex:  male  Medical Record #: [id number]  Today's Date: 10/31/2022    Discussed missed therapy with    10/31/22 0802   Treatment Variance   Reason For Missed Therapy Non-Medical - Patient Refused   Interdisciplinary Plan of Care Collaboration   IDT Collaboration with  Nursing   Patient Position at End of Therapy In Bed;Bed Alarm On   Collaboration Comments Attempted PT tx this morning however pt adamately refuses all forms of therapy, states he \"doesn't feel like it right now\". Provided max education/benefits of therapy however pt continues to adamately refuse at this time. Rn notified. Will f/u.   Session Information   Date / Session Number  10/28-4(1/4,11/2) REFUSED 10/31       Kierra Brower, PT,DPT   "
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
## 2022-11-01 VITALS
DIASTOLIC BLOOD PRESSURE: 61 MMHG | TEMPERATURE: 98.4 F | HEIGHT: 68 IN | RESPIRATION RATE: 18 BRPM | OXYGEN SATURATION: 91 % | BODY MASS INDEX: 21.85 KG/M2 | WEIGHT: 144.18 LBS | SYSTOLIC BLOOD PRESSURE: 126 MMHG | HEART RATE: 86 BPM

## 2022-11-01 PROBLEM — Z02.9 DISCHARGE PLANNING ISSUES: Status: RESOLVED | Noted: 2022-10-22 | Resolved: 2022-11-01

## 2022-11-01 PROBLEM — Z75.8 DISCHARGE PLANNING ISSUES: Status: RESOLVED | Noted: 2022-10-22 | Resolved: 2022-11-01

## 2022-11-01 PROBLEM — T14.90XA TRAUMA: Status: RESOLVED | Noted: 2022-10-17 | Resolved: 2022-11-01

## 2022-11-01 PROBLEM — I10 PRIMARY HYPERTENSION: Status: ACTIVE | Noted: 2022-11-01

## 2022-11-01 PROBLEM — S61.219A: Status: RESOLVED | Noted: 2022-10-17 | Resolved: 2022-11-01

## 2022-11-01 PROCEDURE — 700102 HCHG RX REV CODE 250 W/ 637 OVERRIDE(OP): Performed by: NURSE PRACTITIONER

## 2022-11-01 PROCEDURE — 92526 ORAL FUNCTION THERAPY: CPT

## 2022-11-01 PROCEDURE — 700111 HCHG RX REV CODE 636 W/ 250 OVERRIDE (IP): Performed by: PHYSICIAN ASSISTANT

## 2022-11-01 PROCEDURE — 700102 HCHG RX REV CODE 250 W/ 637 OVERRIDE(OP): Performed by: SURGERY

## 2022-11-01 PROCEDURE — A9270 NON-COVERED ITEM OR SERVICE: HCPCS | Performed by: SURGERY

## 2022-11-01 PROCEDURE — 99239 HOSP IP/OBS DSCHRG MGMT >30: CPT | Performed by: NURSE PRACTITIONER

## 2022-11-01 PROCEDURE — A9270 NON-COVERED ITEM OR SERVICE: HCPCS | Performed by: NURSE PRACTITIONER

## 2022-11-01 RX ORDER — METAXALONE 400 MG/1
400 TABLET ORAL 3 TIMES DAILY
Qty: 90 TABLET | Status: SHIPPED
Start: 2022-11-01

## 2022-11-01 RX ORDER — OXYCODONE HYDROCHLORIDE 10 MG/1
5-10 TABLET ORAL EVERY 4 HOURS PRN
Qty: 12 TABLET | Refills: 0 | Status: SHIPPED | OUTPATIENT
Start: 2022-11-01 | End: 2022-11-01 | Stop reason: SDUPTHER

## 2022-11-01 RX ORDER — OXYCODONE HYDROCHLORIDE 10 MG/1
5-10 TABLET ORAL EVERY 4 HOURS PRN
Qty: 12 TABLET | Refills: 0 | Status: SHIPPED
Start: 2022-11-01 | End: 2022-11-01 | Stop reason: SDUPTHER

## 2022-11-01 RX ORDER — GABAPENTIN 100 MG/1
200 CAPSULE ORAL EVERY 8 HOURS
Qty: 90 CAPSULE | Status: SHIPPED
Start: 2022-11-01

## 2022-11-01 RX ORDER — AMLODIPINE BESYLATE 10 MG/1
10 TABLET ORAL DAILY
Qty: 30 TABLET | Status: SHIPPED
Start: 2022-11-02

## 2022-11-01 RX ORDER — ACETAMINOPHEN 325 MG/1
650 TABLET ORAL EVERY 6 HOURS PRN
Qty: 30 TABLET | Refills: 0 | Status: SHIPPED
Start: 2022-11-01

## 2022-11-01 RX ORDER — BACITRACIN ZINC 500 [USP'U]/G
1 OINTMENT TOPICAL DAILY
Refills: 0 | Status: SHIPPED
Start: 2022-11-01

## 2022-11-01 RX ORDER — OXYCODONE HYDROCHLORIDE 10 MG/1
5-10 TABLET ORAL EVERY 4 HOURS PRN
Qty: 12 TABLET | Refills: 0 | Status: SHIPPED | OUTPATIENT
Start: 2022-11-01 | End: 2022-11-03

## 2022-11-01 RX ADMIN — BACITRACIN ZINC: 500 OINTMENT TOPICAL at 10:00

## 2022-11-01 RX ADMIN — AMLODIPINE BESYLATE 10 MG: 10 TABLET ORAL at 05:17

## 2022-11-01 RX ADMIN — METAXALONE 400 MG: 800 TABLET ORAL at 11:27

## 2022-11-01 RX ADMIN — GABAPENTIN 200 MG: 100 CAPSULE ORAL at 13:08

## 2022-11-01 RX ADMIN — OXYCODONE HYDROCHLORIDE 10 MG: 10 TABLET ORAL at 09:52

## 2022-11-01 RX ADMIN — METAXALONE 400 MG: 800 TABLET ORAL at 05:16

## 2022-11-01 RX ADMIN — ENOXAPARIN SODIUM 30 MG: 30 INJECTION SUBCUTANEOUS at 05:16

## 2022-11-01 RX ADMIN — GABAPENTIN 200 MG: 100 CAPSULE ORAL at 05:17

## 2022-11-01 RX ADMIN — OMEPRAZOLE 40 MG: KIT at 05:16

## 2022-11-01 RX ADMIN — OXYCODONE HYDROCHLORIDE 10 MG: 10 TABLET ORAL at 05:16

## 2022-11-01 RX ADMIN — OXYCODONE HYDROCHLORIDE 10 MG: 10 TABLET ORAL at 13:08

## 2022-11-01 ASSESSMENT — PAIN DESCRIPTION - PAIN TYPE
TYPE: ACUTE PAIN;CHRONIC PAIN
TYPE: ACUTE PAIN;CHRONIC PAIN

## 2022-11-01 NOTE — CARE PLAN
The patient is Stable - Low risk of patient condition declining or worsening    Shift Goals  Clinical Goals: DC to [facility]  Patient Goals: Get dressing change before discharging  Family Goals: BIJU    Progress made toward(s) clinical / shift goals:  Anticipating discharge to [facility] today. Cervical collar on at all times.
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
## 2022-11-01 NOTE — DISCHARGE PLANNING
0743  Gave packet to RNLouise. Pt will leave today for [facility] today.      No other needs at this time.

## 2022-11-01 NOTE — CARE PLAN
The patient is Stable - Low risk of patient condition declining or worsening    Shift Goals  Clinical Goals: discharge to [facility] today  Patient Goals: pain management  Family Goals: BIJU    Progress made toward(s) clinical / shift goals:  Pt AAOx4 through shift. PRN pain medications given. Pt agreeable to POC, eager to discharge to [facility] tomorrow. Refused dressing change on left arm. States he would like it changed right before discharge.     Problem: Pain - Standard  Goal: Alleviation of pain or a reduction in pain to the patient’s comfort goal  Outcome: Progressing     Problem: Skin Integrity  Goal: Skin integrity is maintained or improved  Outcome: Progressing     Problem: Fall Risk  Goal: Patient will remain free from falls  Outcome: Progressing     Problem: Neuro Status  Goal: Neuro status will remain stable or improve  Outcome: Progressing       Patient is not progressing towards the following goals:

## 2022-11-01 NOTE — DISCHARGE PLANNING
Case Management Discharge Planning    Admission Date: 10/17/2022  GMLOS: 7.3  ALOS: 15    6-Clicks ADL Score: 15  6-Clicks Mobility Score: 13  PT and/or OT Eval ordered: Yes  Post-acute Referrals Ordered: Yes  Post-acute Choice Obtained: Yes  Has referral(s) been sent to post-acute provider:  Yes      Anticipated Discharge Dispo: Discharge Disposition: D/T to SNF with Medicare cert in anticipation of skilled care (03)  Discharge Address: [address] [place] [address] [place], [place], [place] ([facility])    DME Needed: No    Action(s) Taken: Updated Provider/Nurse on Discharge Plan, DC Assessment Complete (See below), Choice obtained, Referral(s) sent, DME Face to Face , Acceptance Received, Transport Arranged , Authorization Sent, and Authorization Received     Escalations Completed: None    Medically Clear: Yes    Next Steps: Pt will be transported by [facility] @ 1300 to [facility]. [facility].    Barriers to Discharge: None

## 2022-11-01 NOTE — CARE PLAN
The patient is Watcher - Medium risk of patient condition declining or worsening    Shift Goals  Clinical Goals: [facility]  Patient Goals: sleep  Family Goals: BIJU    Progress made toward(s) clinical / shift goals:  discharge [facility] today  Problem: Knowledge Deficit - Standard  Goal: Patient and family/care givers will demonstrate understanding of plan of care, disease process/condition, diagnostic tests and medications  Outcome: Progressing     Problem: Pain - Standard  Goal: Alleviation of pain or a reduction in pain to the patient’s comfort goal  Outcome: Progressing     Problem: Skin Integrity  Goal: Skin integrity is maintained or improved  Outcome: Progressing     Problem: Fall Risk  Goal: Patient will remain free from falls  Outcome: Progressing       Patient is not progressing towards the following goals:

## 2025-06-24 ENCOUNTER — HOSPITAL ENCOUNTER (OUTPATIENT)
Dept: RADIOLOGY | Facility: MEDICAL CENTER | Age: 75
End: 2025-06-24
Payer: MEDICARE

## 2025-06-25 ENCOUNTER — HOSPITAL ENCOUNTER (OUTPATIENT)
Dept: RADIOLOGY | Facility: MEDICAL CENTER | Age: 75
End: 2025-06-25

## 2025-06-25 ENCOUNTER — OFFICE VISIT (OUTPATIENT)
Facility: MEDICAL CENTER | Age: 75
End: 2025-06-25
Payer: MEDICARE

## 2025-06-25 ENCOUNTER — HOSPITAL ENCOUNTER (OUTPATIENT)
Dept: RADIOLOGY | Facility: MEDICAL CENTER | Age: 75
End: 2025-06-25
Attending: NURSE PRACTITIONER

## 2025-06-25 VITALS
OXYGEN SATURATION: 99 % | SYSTOLIC BLOOD PRESSURE: 118 MMHG | BODY MASS INDEX: 23.03 KG/M2 | WEIGHT: 143.3 LBS | DIASTOLIC BLOOD PRESSURE: 72 MMHG | HEART RATE: 75 BPM | HEIGHT: 66 IN | TEMPERATURE: 98.3 F

## 2025-06-25 DIAGNOSIS — C02.9 TONGUE CANCER (HCC): ICD-10-CM

## 2025-06-25 DIAGNOSIS — C22.0 HEPATOCELLULAR CARCINOMA (HCC): Primary | ICD-10-CM

## 2025-06-25 DIAGNOSIS — K74.60 HEPATIC CIRRHOSIS, UNSPECIFIED HEPATIC CIRRHOSIS TYPE, UNSPECIFIED WHETHER ASCITES PRESENT (HCC): ICD-10-CM

## 2025-06-25 RX ORDER — CHLORHEXIDINE GLUCONATE ORAL RINSE 1.2 MG/ML
SOLUTION DENTAL
COMMUNITY
Start: 2025-06-17

## 2025-06-25 RX ORDER — MULTIVITAMIN
TABLET ORAL
COMMUNITY

## 2025-06-25 RX ORDER — FAMOTIDINE 20 MG/1
20 TABLET, FILM COATED ORAL
COMMUNITY

## 2025-06-25 NOTE — PROGRESS NOTES
Subjective:   6/25/2025  8:38 AM  Primary care physician: Pcp Pt States None  Referring Provider: Geremias Conte M.D.   Medical Oncologist: Geremias Conte M.D.     Chief Complaint:   Chief Complaint   Patient presents with    New Patient     HCC  PET 5/22  CT 5/9, 5/2  HX TONGUE CANCER        Diagnosis:   1. Hepatocellular carcinoma (HCC)        2. Hepatic cirrhosis, unspecified hepatic cirrhosis type, unspecified whether ascites present (HCC)        3. Tongue cancer (HCC)            History of presenting illness:    Terence Mckeon is a pleasant 75 y.o. male with PMH of tongue cancer s/p radiation who is referred by his oncologist for evaluation of newly diagnosed hepatocellular carcinoma.    CT ABD on 5/2/25 noted cirrhotic liver disease and marked splenomegaly relatively stable, gastrostomy tube in place without overt complication, cholelithiasis with no overt inflammatory changes, and possible dome liver mass at segment 8.    CT ABD on 5/9/25 noted three right hepatic lobe lesions consistent with hepatocellular carcinoma (LI-RADS 5), cirrhosis and splenomegaly, cholelithiasis, and gastrostomy tube in good positioning.     PET CT on 5/9/25 noted hypermetabolic right mandibular lesion which could represent metastatic disease or an area of periodontal infection inflammation. And previous reported hepatic masses are not conspicuous on noncontrast CT and do not demonstrate hypermetabolic uptake. These remain consistent with hepatocellular carcinoma which is not typically hypermetabolic.     He is here today for surgical evaluation.  He has a history of heavy drinking which is the cause of his cirrhosis but does not currently drink.  He had tongue cancer in the past treated with radiation and currently has an indwelling gtube.  He was having pain and fatigue which is what led to his imaging identifying this newly found HCC.  Further staging does not show any  metastatic disease.  At baseline he is tolerating a diet pretty well with some use of his gtube still.  No history of decompensated cirrhosis and denies any history of jaundice, bleeding or ascites.    Past Medical History[1]  Past Surgical History[2]  Allergies[3]  Encounter Medications[4]  Social History     Socioeconomic History    Marital status: Unknown     Spouse name: Not on file    Number of children: Not on file    Years of education: Not on file    Highest education level: Not on file   Occupational History    Not on file   Tobacco Use    Smoking status: Former     Types: Cigarettes    Smokeless tobacco: Not on file   Vaping Use    Vaping status: Never Used   Substance and Sexual Activity    Alcohol use: Not Currently    Drug use: Not on file    Sexual activity: Not on file   Other Topics Concern    Not on file   Social History Narrative    Not on file     Social Drivers of Health     Financial Resource Strain: Low Risk  (5/28/2025)    Received from [facility]    [facility] Financial Resource Strain     In the last month, did you feel there was not enough money for items like clothing or furniture?: No   Food Insecurity: Low Risk  (5/28/2025)    Received from [facility]    [facility] Food Insecurity     In the last month, did you feel there was not enough money for food?: No   Transportation Needs: Low Risk  (5/28/2025)    Received from [facility]    [facility] Transportation Needs     In the last month, have you not seen a doctor because you didn't have a way to get to the clinic or hospital?: No   Physical Activity: Not on File (4/29/2024)    Received from [facility]    Physical Activity     Physical Activity: 0   Stress: Not on File (4/29/2024)    Received from [facility]    Stress     Stress: 0   Social Connections: Not on File (9/28/2024)    Received from [facility]    Social Connections     Connectedness: 0   Intimate Partner Violence: Low Risk  (4/13/2023)    Received from  "[facility]    History of Abuse     History of Abuse: Denies   Housing Stability: Low Risk  (5/28/2025)    Received from [facility]    [facility] Housing Needs     In the last month, was there a time when you were not able to pay your mortgage or rent?: No     In the last month, have you slept outside, in a shelter, in a car, or any place not meant for sleeping?: No      Tobacco Use History[5]  Social History     Substance and Sexual Activity   Alcohol Use Not Currently     Social History     Substance and Sexual Activity   Drug Use Not on file      Family History   Problem Relation Age of Onset    Drug abuse Brother        Review of Systems   Constitutional:  Negative for chills, fever, malaise/fatigue and weight loss.   HENT:  Negative for congestion, ear discharge, ear pain, hearing loss and nosebleeds.    Eyes:  Negative for blurred vision, double vision, photophobia, pain and discharge.   Respiratory:  Negative for cough, hemoptysis and sputum production.    Cardiovascular:  Negative for chest pain, palpitations, orthopnea and claudication.   Gastrointestinal:  Negative for abdominal pain, constipation, diarrhea, heartburn, nausea and vomiting.   Genitourinary:  Negative for dysuria, frequency, hematuria and urgency.   Musculoskeletal:  Negative for back pain, joint pain, myalgias and neck pain.   Skin:  Negative for itching and rash.   Neurological:  Negative for dizziness, tingling, tremors, sensory change, speech change, focal weakness and headaches.   Endo/Heme/Allergies:  Negative for environmental allergies. Does not bruise/bleed easily.   Psychiatric/Behavioral:  Negative for depression, hallucinations, substance abuse and suicidal ideas. The patient is not nervous/anxious.         Objective:   /72 (BP Location: Right arm, Patient Position: Sitting, BP Cuff Size: Adult)   Pulse 75   Temp 36.8 °C (98.3 °F) (Temporal)   Ht 1.676 m (5' 6\")   Wt 65 kg (143 lb 4.8 oz)   " SpO2 99%   BMI 23.13 kg/m²     Physical Exam  Constitutional:       General: He is not in acute distress.     Appearance: He is not ill-appearing.   HENT:      Head: Normocephalic.   Eyes:      General: No scleral icterus.     Pupils: Pupils are equal, round, and reactive to light.   Cardiovascular:      Rate and Rhythm: Normal rate and regular rhythm.   Pulmonary:      Effort: Pulmonary effort is normal. No respiratory distress.   Abdominal:      General: Abdomen is flat. There is no distension.      Palpations: Abdomen is soft.      Tenderness: There is no abdominal tenderness.      Comments: Indwelling gtube   Skin:     Coloration: Skin is not jaundiced.   Neurological:      General: No focal deficit present.      Mental Status: He is alert and oriented to person, place, and time.         Labs  4/10/25           Imaging  PET CT 5/9/25  IMPRESSION:  1.  Hypermetabolic right mandibular lesion which could represent metastatic disease or an area of periodontal infection inflammation. Consider follow-up with MRI with and without contrast.   2.  Previous reported hepatic masses are not conspicuous on noncontrast CT and do not demonstrate hypermetabolic uptake. These remain consistent with hepatocellular carcinoma which is not typically hypermetabolic.     CT ABD 5/9/25  IMPRESSION:  1.   Three right hepatic lobe lesions are consistent with hepatocellular carcinoma (LI-RADS 5).   2.  Cirrhosis and splenomegaly.   3.  Cholelithiasis.   4.  Gastrostomy tube in good positioning.     CT ABD 5/2/25  IMPRESSION:  1.  Cirrhotic liver disease and marked splenomegaly, relatively stable.   2.  Gastrostomy tube in place, without overt complication.   3.  Cholelithiasis. No overt inflammatory changes.   4.  Possible dome liver mass at segment 8. Consider further evaluation with dynamic CT or MRI, malignancy in the differential diagnosis.     Pathology  none    Procedures  none    Diagnosis:     1. Hepatocellular carcinoma (HCC)         2. Hepatic cirrhosis, unspecified hepatic cirrhosis type, unspecified whether ascites present (HCC)        3. Tongue cancer (HCC)            Medical Decision Making:  Today's Assessment / Status / Plan:     75M with history of tongue cancer s/p radiation with gtube, EtOH cirrhosis, ihsan A, presents with a newly diagnosed HCC in the right lobe.  He has a dominant lesion with at least 2 satellite lesions present in the dome of the liver.    I explained the diagnosis, cause, prognosis, staging and treatment options.  Given the multifocal disease as well as the location I do not think he would be a good surgical candidate.  I did discuss transplant may be an option should he be interested.  Other therapies would include radiation, arterial based therapies and ablation.  Likely a difficult ablation lap or percutaneous given location.  My recommendation would be to proceed with an arterial based therapy either TACE or TARE.  I will refer him for discussion of these treatment options.     I, Dario Shultz MD have entered, reviewed and confirmed the above diagnosis related to this patient on this date of service, June 25, 2025     Dario Shultz M.D.           [1]   Past Medical History:  Diagnosis Date    Cirrhosis (HCC)     Hypertension     Tongue cancer (HCC)    [2]   Past Surgical History:  Procedure Laterality Date    POSTERIOR CERVICAL FUSION O-ARM  10/19/2022    Procedure: C5-T2 POSTERIOR FUSION WITH OARM;  Surgeon: Cyndi Tian M.D.;  Location: SURGERY [facility];  Service: Neuro Robotic    AMPUTATION, BELOW THE KNEE Right    [3] No Known Allergies  [4]   Outpatient Encounter Medications as of 6/25/2025   Medication Sig Dispense Refill    chlorhexidine (PERIDEX) 0.12 % Solution       oxyCODONE immediate-release (ROXICODONE) 5 MG Tab Take 5 mg by mouth every four hours as needed (breakthrough).      Multiple Vitamin (MULTI-VITAMIN) Tab  (Patient not taking: Reported on 6/25/2025)      famotidine  (PEPCID) 20 MG Tab Take 20 mg by mouth. (Patient not taking: Reported on 6/25/2025)      acetaminophen (TYLENOL) 325 MG Tab 2 Tablets by Enteral Tube route every 6 hours as needed for Mild Pain or Moderate Pain. (Patient not taking: Reported on 6/25/2025) 30 Tablet 0    amLODIPine (NORVASC) 10 MG Tab 1 Tablet by Enteral Tube route every day. 30 Tablet     bacitracin 500 UNIT/GM Ointment Apply 1 Each topically every day. (Patient not taking: Reported on 6/25/2025)  0    gabapentin (NEURONTIN) 100 MG Cap 2 Capsules by Enteral Tube route every 8 hours. (Patient not taking: Reported on 6/25/2025) 90 Capsule     metaxalone (SKELAXIN) 400 MG Tab 1 Tablet by Enteral Tube route 3 times a day. (Patient not taking: Reported on 6/25/2025) 90 Tablet     oxyCODONE CR (OXYCONTIN) 10 MG Tablet Extended Release 12 hour Abuse-Deterrent Take 10 mg by mouth every 12 hours. (Patient not taking: Reported on 6/25/2025)      LORazepam (ATIVAN) 1 MG Tab Take 1 mg by mouth 2 times a day as needed for Anxiety. Indications: Trouble Sleeping (Patient not taking: Reported on 6/25/2025)      traZODone (DESYREL) 50 MG Tab Take 25-50 mg by mouth at bedtime as needed for Sleep. Indications: Trouble Sleeping (Patient not taking: Reported on 6/25/2025)       No facility-administered encounter medications on file as of 6/25/2025.   [5]   Social History  Tobacco Use   Smoking Status Former    Types: Cigarettes   Smokeless Tobacco Not on file

## 2025-06-30 ASSESSMENT — ENCOUNTER SYMPTOMS
SPEECH CHANGE: 0
DEPRESSION: 0
BLURRED VISION: 0
NECK PAIN: 0
ABDOMINAL PAIN: 0
VOMITING: 0
COUGH: 0
FOCAL WEAKNESS: 0
MYALGIAS: 0
CHILLS: 0
DOUBLE VISION: 0
HALLUCINATIONS: 0
BRUISES/BLEEDS EASILY: 0
CLAUDICATION: 0
ORTHOPNEA: 0
NERVOUS/ANXIOUS: 0
PALPITATIONS: 0
NAUSEA: 0
HEMOPTYSIS: 0
EYE PAIN: 0
SENSORY CHANGE: 0
HEARTBURN: 0
TINGLING: 0
DIARRHEA: 0
WEIGHT LOSS: 0
EYE DISCHARGE: 0
PHOTOPHOBIA: 0
TREMORS: 0
HEADACHES: 0
CONSTIPATION: 0
SPUTUM PRODUCTION: 0
BACK PAIN: 0
FEVER: 0
DIZZINESS: 0

## 2025-06-30 ASSESSMENT — LIFESTYLE VARIABLES: SUBSTANCE_ABUSE: 0

## (undated) DEVICE — BOVIE BLADE COATED &INSULATED - 25/PK

## (undated) DEVICE — BLANKET WARMING LOWER BODY (10EA/CA)

## (undated) DEVICE — BIT DRILL 2.4MM NAVIGATED

## (undated) DEVICE — SET LEADWIRE 5 LEAD BEDSIDE DISPOSABLE ECG (1SET OF 5/EA)

## (undated) DEVICE — SPHERE NAVIGATION STEALTH (5EA/TY 12TY/PK)

## (undated) DEVICE — SET EXTENSION WITH 2 PORTS (48EA/CA) ***PART #2C8610 IS A SUBSTITUTE*****

## (undated) DEVICE — SEALER BIPOLAR 2.3 AQUAMANTYS

## (undated) DEVICE — CANISTER SUCTION 3000ML MECHANICAL FILTER AUTO SHUTOFF MEDI-VAC NONSTERILE LF DISP  (40EA/CA)

## (undated) DEVICE — GLOVE BIOGEL PI INDICATOR SZ 7.5 SURGICAL PF LF -(50/BX 4BX/CA)

## (undated) DEVICE — DRAPE PATIENT STERILE FOR USE WITH O OR C ARMS (10EA/BX)

## (undated) DEVICE — TOWELS CLOTH SURGICAL - (4/PK 20PK/CA)

## (undated) DEVICE — TUBING CLEARLINK DUO-VENT - C-FLO (48EA/CA)

## (undated) DEVICE — SPHERE NAVIGATION STEALTH (4EA/PK12PK/BX)

## (undated) DEVICE — FORCEPS ELECTROSURGICAL DISPOSABLE CODMAN 8IN 1.5MM

## (undated) DEVICE — COVER MAYO STAND X-LG - (22EA/CA)

## (undated) DEVICE — ELECTRODE DUAL RETURN W/ CORD - (50/PK)

## (undated) DEVICE — DRAPE LAPAROTOMY T SHEET - (12EA/CA)

## (undated) DEVICE — BAG SPONGE COUNT 10.25 X 32 - BLUE (250/CA)

## (undated) DEVICE — TOWEL STOP TIMEOUT SAFETY FLAG (40EA/CA)

## (undated) DEVICE — GOWN WARMING STANDARD FLEX - (30/CA)

## (undated) DEVICE — SLEEVE, VASO, THIGH, MED

## (undated) DEVICE — CHLORAPREP 26 ML APPLICATOR - ORANGE TINT(25/CA)

## (undated) DEVICE — TUBING C&T SET FLYING LEADS DRAIN TUBING (10EA/BX)

## (undated) DEVICE — SODIUM CHL IRRIGATION 0.9% 1000ML (12EA/CA)

## (undated) DEVICE — SUTURE 0 COATED VICRYL PLUS - CTX CR(12/BX)18 IN

## (undated) DEVICE — DRESSING TRANSPARENT FILM TEGADERM 4 X 4.75" (50EA/BX)"

## (undated) DEVICE — DRESSING POST OP BORDER 4 X 10 (5EA/BX)

## (undated) DEVICE — KIT HEMOSTATIC GELATIN MATRIX FLOSEAL NT 5ML (6EA/CA)

## (undated) DEVICE — LACTATED RINGERS INJ 1000 ML - (14EA/CA 60CA/PF)

## (undated) DEVICE — LACTATED RINGERS INJ. 500 ML - (24EA/CA)

## (undated) DEVICE — PIN HEAD MAYFIELD DISP. (3EA/PK 12PK/BX)

## (undated) DEVICE — DRILL BIT NAVIGATED 2.4MM

## (undated) DEVICE — SPONGE GAUZESTER. 2X2 4-PL - (2/PK 50PK/BX 30BX/CS)

## (undated) DEVICE — SUTURE GENERAL

## (undated) DEVICE — STYLETTE 6FR INFANT STERILE SINGEL ALUMINUM SUNSLIP SEALED IN PVC SHEATH (20EA/BX)

## (undated) DEVICE — TOOL MR8 14CM MATCH HD SYM-TRI 3MM DIAMETER (1/EA)

## (undated) DEVICE — COVER LIGHT HANDLE ALC PLUS DISP (18EA/BX)

## (undated) DEVICE — PEN SKIN MARKER W/RULER - (50EA/BX)

## (undated) DEVICE — MIDAS LUBRICATOR DIFFUSER PACK (4EA/CA)

## (undated) DEVICE — KIT EVACUATER 3 SPRING PVC LF 1/8 DRAIN SIZE (10EA/CA)"

## (undated) DEVICE — SUTURE 2-0 VICRYL PLUS CT-1 - 8 X 18 INCH(12/BX)

## (undated) DEVICE — SYRINGE NON SAFETY 10 CC 20 GA X 1-1/2 IN (100/BX 4BX/CA)

## (undated) DEVICE — TRAY CATHETER FOLEY URINE METER W/STATLOCK 350ML (10EA/CA)

## (undated) DEVICE — GLOVE SZ 7.5 BIOGEL PI MICRO - PF LF (50PR/BX)

## (undated) DEVICE — DRAPE CLEAR W/ELASTIC BAND RAD CARM 40 X40" (20EA/CA)"

## (undated) DEVICE — TOOL MR8 15CM MATCH HEAD 2.2MM DIAMETER (1/EA)

## (undated) DEVICE — PACK NEURO - (2EA/CA)

## (undated) DEVICE — SUCTION INSTRUMENT YANKAUER BULBOUS TIP W/O VENT (50EA/CA)